# Patient Record
Sex: MALE | Race: WHITE
[De-identification: names, ages, dates, MRNs, and addresses within clinical notes are randomized per-mention and may not be internally consistent; named-entity substitution may affect disease eponyms.]

---

## 2017-04-24 ENCOUNTER — HOSPITAL ENCOUNTER (OUTPATIENT)
Dept: HOSPITAL 53 - M SFHCPLAZ | Age: 82
End: 2017-04-24
Attending: INTERNAL MEDICINE
Payer: COMMERCIAL

## 2017-04-24 DIAGNOSIS — E78.5: ICD-10-CM

## 2017-04-24 DIAGNOSIS — Z79.4: ICD-10-CM

## 2017-04-24 DIAGNOSIS — E11.9: Primary | ICD-10-CM

## 2017-04-24 LAB
ALBUMIN SERPL BCG-MCNC: 3.4 GM/DL (ref 3.2–5.2)
ALBUMIN/GLOB SERPL: 1.17 {RATIO} (ref 1–1.93)
ALP SERPL-CCNC: 89 U/L (ref 45–117)
ALT SERPL W P-5'-P-CCNC: 16 U/L (ref 12–78)
ANION GAP SERPL CALC-SCNC: 5 MEQ/L (ref 8–16)
AST SERPL-CCNC: 10 U/L (ref 15–37)
BILIRUB SERPL-MCNC: 0.6 MG/DL (ref 0.2–1)
BUN SERPL-MCNC: 14 MG/DL (ref 7–18)
CALCIUM SERPL-MCNC: 8.5 MG/DL (ref 8.8–10.2)
CHLORIDE SERPL-SCNC: 106 MEQ/L (ref 98–107)
CHOLEST SERPL-MCNC: 119 MG/DL (ref ?–200)
CO2 SERPL-SCNC: 32 MEQ/L (ref 21–32)
CREAT SERPL-MCNC: 0.87 MG/DL (ref 0.7–1.3)
ERYTHROCYTE [DISTWIDTH] IN BLOOD BY AUTOMATED COUNT: 14.3 % (ref 11.5–14.5)
EST. AVERAGE GLUCOSE BLD GHB EST-MCNC: 146 MG/DL (ref 60–110)
GFR SERPL CREATININE-BSD FRML MDRD: > 60 ML/MIN/{1.73_M2} (ref 35–?)
GLUCOSE SERPL-MCNC: 172 MG/DL (ref 83–110)
MCH RBC QN AUTO: 30.3 PG (ref 27–33)
MCHC RBC AUTO-ENTMCNC: 32.6 G/DL (ref 32–36.5)
MCV RBC AUTO: 93 FL (ref 80–96)
PLATELET # BLD AUTO: 219 K/MM3 (ref 150–450)
POTASSIUM SERPL-SCNC: 4.1 MEQ/L (ref 3.5–5.1)
PROT SERPL-MCNC: 6.3 GM/DL (ref 6.4–8.2)
SODIUM SERPL-SCNC: 143 MEQ/L (ref 136–145)
TRIGL SERPL-MCNC: 141 MG/DL (ref ?–150)
WBC # BLD AUTO: 5.2 K/MM3 (ref 4–10)

## 2017-10-19 ENCOUNTER — HOSPITAL ENCOUNTER (OUTPATIENT)
Dept: HOSPITAL 53 - M SFHCPLAZ | Age: 82
End: 2017-10-19
Attending: INTERNAL MEDICINE
Payer: COMMERCIAL

## 2017-10-19 DIAGNOSIS — E11.9: Primary | ICD-10-CM

## 2017-10-19 LAB
ALBUMIN SERPL BCG-MCNC: 3.1 GM/DL (ref 3.2–5.2)
ALBUMIN/GLOB SERPL: 1.03 {RATIO} (ref 1–1.93)
ALP SERPL-CCNC: 114 U/L (ref 45–117)
ALT SERPL W P-5'-P-CCNC: 23 U/L (ref 12–78)
ANION GAP SERPL CALC-SCNC: 6 MEQ/L (ref 8–16)
AST SERPL-CCNC: 11 U/L (ref 15–37)
BILIRUB SERPL-MCNC: 0.5 MG/DL (ref 0.2–1)
BUN SERPL-MCNC: 13 MG/DL (ref 7–18)
CALCIUM SERPL-MCNC: 8.5 MG/DL (ref 8.8–10.2)
CHLORIDE SERPL-SCNC: 106 MEQ/L (ref 98–107)
CO2 SERPL-SCNC: 31 MEQ/L (ref 21–32)
CREAT SERPL-MCNC: 0.88 MG/DL (ref 0.7–1.3)
EST. AVERAGE GLUCOSE BLD GHB EST-MCNC: 148 MG/DL (ref 60–110)
GFR SERPL CREATININE-BSD FRML MDRD: > 60 ML/MIN/{1.73_M2} (ref 35–?)
GLUCOSE SERPL-MCNC: 75 MG/DL (ref 83–110)
POTASSIUM SERPL-SCNC: 3.7 MEQ/L (ref 3.5–5.1)
PROT SERPL-MCNC: 6.1 GM/DL (ref 6.4–8.2)
SODIUM SERPL-SCNC: 143 MEQ/L (ref 136–145)

## 2017-10-27 ENCOUNTER — HOSPITAL ENCOUNTER (OUTPATIENT)
Dept: HOSPITAL 53 - M SFHCPLAZ | Age: 82
End: 2017-10-27
Attending: INTERNAL MEDICINE
Payer: COMMERCIAL

## 2017-10-27 DIAGNOSIS — Z23: ICD-10-CM

## 2017-10-27 DIAGNOSIS — R31.0: Primary | ICD-10-CM

## 2017-10-27 PROCEDURE — 81001 URINALYSIS AUTO W/SCOPE: CPT

## 2017-10-27 PROCEDURE — 87086 URINE CULTURE/COLONY COUNT: CPT

## 2017-10-27 PROCEDURE — 90662 IIV NO PRSV INCREASED AG IM: CPT

## 2017-11-15 ENCOUNTER — HOSPITAL ENCOUNTER (OUTPATIENT)
Dept: HOSPITAL 53 - M SMT | Age: 82
End: 2017-11-15
Attending: NURSE PRACTITIONER
Payer: COMMERCIAL

## 2017-11-15 DIAGNOSIS — R31.0: Primary | ICD-10-CM

## 2017-11-15 LAB
ANION GAP SERPL CALC-SCNC: 6 MEQ/L (ref 8–16)
BACTERIA URNS QL MICRO: (no result)
BUN SERPL-MCNC: 18 MG/DL (ref 7–18)
CALCIUM SERPL-MCNC: 8.5 MG/DL (ref 8.8–10.2)
CHLORIDE SERPL-SCNC: 109 MEQ/L (ref 98–107)
CO2 SERPL-SCNC: 29 MEQ/L (ref 21–32)
CREAT SERPL-MCNC: 1.04 MG/DL (ref 0.7–1.3)
GFR SERPL CREATININE-BSD FRML MDRD: > 60 ML/MIN/{1.73_M2} (ref 35–?)
GLUCOSE SERPL-MCNC: 144 MG/DL (ref 83–110)
HYALINE CASTS URNS QL MICRO: (no result) /LPF (ref 0–1)
MICROSCOPIC EXAM: (no result)
MICROSCOPIC INDICATED? MAN: YES
POTASSIUM SERPL-SCNC: 4.2 MEQ/L (ref 3.5–5.1)
RBC #/AREA URNS HPF: (no result) /HPF (ref 0–3)
SODIUM SERPL-SCNC: 144 MEQ/L (ref 136–145)
SQUAMOUS URNS QL MICRO: (no result) /HPF
TRANS CELLS #/AREA URNS HPF: (no result) /HPF
WBC #/AREA URNS HPF: (no result) /HPF (ref 0–3)

## 2017-11-15 PROCEDURE — 36415 COLL VENOUS BLD VENIPUNCTURE: CPT

## 2017-11-15 PROCEDURE — 51798 US URINE CAPACITY MEASURE: CPT

## 2017-11-15 PROCEDURE — 87086 URINE CULTURE/COLONY COUNT: CPT

## 2017-11-15 PROCEDURE — 81000 URINALYSIS NONAUTO W/SCOPE: CPT

## 2017-11-15 PROCEDURE — 80048 BASIC METABOLIC PNL TOTAL CA: CPT

## 2017-11-15 PROCEDURE — 88108 CYTOPATH CONCENTRATE TECH: CPT

## 2017-12-01 ENCOUNTER — HOSPITAL ENCOUNTER (OUTPATIENT)
Dept: HOSPITAL 53 - M SMT | Age: 82
End: 2017-12-01
Attending: UROLOGY
Payer: COMMERCIAL

## 2017-12-01 DIAGNOSIS — R31.0: Primary | ICD-10-CM

## 2017-12-13 ENCOUNTER — HOSPITAL ENCOUNTER (OUTPATIENT)
Dept: HOSPITAL 53 - M RAD | Age: 82
End: 2017-12-13
Attending: NURSE PRACTITIONER
Payer: COMMERCIAL

## 2017-12-13 DIAGNOSIS — N20.1: ICD-10-CM

## 2017-12-13 DIAGNOSIS — N20.0: Primary | ICD-10-CM

## 2017-12-13 DIAGNOSIS — K57.30: ICD-10-CM

## 2017-12-13 DIAGNOSIS — R93.41: ICD-10-CM

## 2017-12-13 DIAGNOSIS — N21.0: ICD-10-CM

## 2017-12-13 PROCEDURE — 74178 CT ABD&PLV WO CNTR FLWD CNTR: CPT

## 2017-12-13 NOTE — REP
CT urogram without and with IV contrast:

 

History:  Hematuria.  No comparison imaging.

 

CT contrast dose:  100 mL of intravenous Isovue 370.

 

CT findings:  Preliminary digital  radiograph shows a normal bowel gas

pattern.  There is a peripherally calcified large gallstone in the right upper

quadrant.  Sutures are seen in the right lower quadrant abdominal wall.

 

Axial CT images demonstrate minimal bibasilar interstitial pulmonary fibrosis.

No pleural effusion is seen.  No adrenal lesion is observed.  The liver and the

spleen are normal in size homogeneous in texture.  A large calcified gallstone is

confirmed within the gallbladder.  The stone measures 2.1 cm in greatest

diameter.  No pancreatic abnormality is observed.  No retroperitoneal mass or

adenopathy is seen.  There is no evidence of hydronephrosis.  There is a tiny 1

mm intrarenal calculus at the lower pole of the left kidney seen on noncontrast

study.  No other intrarenal nephrolithiasis is appreciated.  No mass or cyst is

seen.  Normal caliber aorta is observed.  There is a 3 mm calculus in the lumen

of the urinary bladder to the left of midline on noncontrast study.  Urinary

bladder is otherwise unremarkable.  No ureteral calculus is seen.  There are

dystrophic calcifications in the prostate.  There is sigmoid colon diverticulosis

without CT evidence of diverticulitis.  The patient is status post prior

appendectomy.  No abdominal wall defect is seen.  Bone window settings show

degenerative changes in the thoracic and lumbar spine segments.  No bony

destructive lesion is seen. Delayed scanning shows no filling defect in the

collecting system or ureters on either side.  There is however evidence of a left

posterior bladder mass measuring approximately 3-4 cm in size.

 

Impression:

 

1.  Tiny 1 mm intrarenal calculus lower pole left kidney.

 

2.  3 mm bladder calculus.

 

3.  No hydronephrosis.  No urinary tract mass lesion seen.

 

4.  3.8 cm left posterior bladder mass.

 

5.  Cholelithiasis.

 

6.  Left colonic diverticulosis.

 

 

Signed by

Perfecto Hwang MD 12/14/2017 10:56 A

## 2018-01-12 ENCOUNTER — HOSPITAL ENCOUNTER (OUTPATIENT)
Dept: HOSPITAL 53 - M SFHCPLAZ | Age: 83
End: 2018-01-12
Attending: INTERNAL MEDICINE
Payer: COMMERCIAL

## 2018-01-12 DIAGNOSIS — D49.4: ICD-10-CM

## 2018-01-12 DIAGNOSIS — Z79.899: ICD-10-CM

## 2018-01-12 DIAGNOSIS — Z79.4: ICD-10-CM

## 2018-01-12 DIAGNOSIS — Z01.818: Primary | ICD-10-CM

## 2018-01-12 DIAGNOSIS — E11.9: ICD-10-CM

## 2018-01-12 DIAGNOSIS — R31.0: ICD-10-CM

## 2018-01-12 PROCEDURE — 87086 URINE CULTURE/COLONY COUNT: CPT

## 2018-01-16 ENCOUNTER — HOSPITAL ENCOUNTER (OUTPATIENT)
Dept: HOSPITAL 53 - M SMT | Age: 83
End: 2018-01-16
Attending: UROLOGY
Payer: COMMERCIAL

## 2018-01-16 DIAGNOSIS — D49.4: ICD-10-CM

## 2018-01-16 DIAGNOSIS — R31.0: ICD-10-CM

## 2018-01-16 DIAGNOSIS — Z01.818: Primary | ICD-10-CM

## 2018-01-16 LAB
ANION GAP: 6 MEQ/L (ref 8–16)
APPEARANCE, URINE: (no result)
BACTERIA UR QL AUTO: NEGATIVE
BILIRUBIN, URINE AUTO: NEGATIVE
BLOOD UREA NITROGEN: 17 MG/DL (ref 7–18)
BLOOD, URINE BLOOD: (no result)
CALCIUM LEVEL: 8.5 MG/DL (ref 8.8–10.2)
CARBON DIOXIDE LEVEL: 30 MEQ/L (ref 21–32)
CHLORIDE LEVEL: 107 MEQ/L (ref 98–107)
CREATININE FOR GFR: 1.17 MG/DL (ref 0.7–1.3)
GFR SERPL CREATININE-BSD FRML MDRD: > 60 ML/MIN/{1.73_M2} (ref 35–?)
GLUCOSE, FASTING: 174 MG/DL (ref 83–110)
GLUCOSE, URINE (UA) AUTO: NEGATIVE MG/DL
HEMATOCRIT: 38.9 % (ref 42–52)
HEMOGLOBIN: 12.3 G/DL (ref 14–18)
INR: 1.07
KETONE, URINE AUTO: NEGATIVE MG/DL
LEUKOCYTE ESTERASE UR QL STRIP.AUTO: (no result)
MEAN CORPUSCULAR HEMOGLOBIN: 27.6 PG (ref 27–33)
MEAN CORPUSCULAR HGB CONC: 31.6 G/DL (ref 32–36.5)
MEAN CORPUSCULAR VOLUME: 87.4 FL (ref 80–96)
MUCUS, URINE: (no result)
NITRITE, URINE AUTO: NEGATIVE
NRBC BLD AUTO-RTO: 0 % (ref 0–0)
PARTIAL THROMBOPLASTIN TIME: 31 SECONDS (ref 26.8–37.9)
PH,URINE: 5 UNITS (ref 5–9)
PLATELET COUNT, AUTOMATED: 333 10^3/UL (ref 150–450)
POTASSIUM SERUM: 4 MEQ/L (ref 3.5–5.1)
PROT UR QL STRIP.AUTO: (no result) MG/DL
PROTHROMBIN TIME: 14 SECONDS (ref 12.4–14.5)
RBC, URINE AUTO: (no result) /HPF (ref 0–3)
RED BLOOD COUNT: 4.45 10^6/UL (ref 4.3–6.1)
RED CELL DISTRIBUTION WIDTH: 15.4 % (ref 11.5–14.5)
SODIUM LEVEL: 143 MEQ/L (ref 136–145)
SPECIFIC GRAVITY URINE AUTO: 1.02 (ref 1–1.03)
SQUAMOUS #/AREA URNS AUTO: 2 /HPF (ref 0–6)
UROBILINOGEN, URINE AUTO: 0.2 MG/DL (ref 0–2)
WBC, URINE AUTO: (no result) /HPF (ref 0–3)
WHITE BLOOD COUNT: 5.7 10^3/UL (ref 4–10)
YEAST LIKE CELL URINE AUTO: (no result)

## 2018-01-16 PROCEDURE — 80048 BASIC METABOLIC PNL TOTAL CA: CPT

## 2018-01-18 ENCOUNTER — HOSPITAL ENCOUNTER (OUTPATIENT)
Dept: HOSPITAL 53 - M SDC | Age: 83
Discharge: HOME | End: 2018-01-18
Attending: UROLOGY
Payer: COMMERCIAL

## 2018-01-18 DIAGNOSIS — E78.00: ICD-10-CM

## 2018-01-18 DIAGNOSIS — C67.2: Primary | ICD-10-CM

## 2018-01-18 DIAGNOSIS — Z79.899: ICD-10-CM

## 2018-01-18 DIAGNOSIS — M54.9: ICD-10-CM

## 2018-01-18 DIAGNOSIS — C67.4: ICD-10-CM

## 2018-01-18 DIAGNOSIS — E10.9: ICD-10-CM

## 2018-01-18 DIAGNOSIS — Z79.82: ICD-10-CM

## 2018-01-18 LAB
GLUCOSE BLDC GLUCOMTR-MCNC: 132 MG/DL (ref 83–110)
GLUCOSE BLDC GLUCOMTR-MCNC: 145 MG/DL (ref 83–110)

## 2018-01-18 PROCEDURE — 52235 CYSTOSCOPY AND TREATMENT: CPT

## 2018-01-18 RX ADMIN — SODIUM CHLORIDE, POTASSIUM CHLORIDE, SODIUM LACTATE AND CALCIUM CHLORIDE 1 MLS/HR: 600; 310; 30; 20 INJECTION, SOLUTION INTRAVENOUS at 09:50

## 2018-01-18 RX ADMIN — FENTANYL CITRATE 1 MCG: 50 INJECTION, SOLUTION INTRAMUSCULAR; INTRAVENOUS at 13:30

## 2018-01-18 RX ADMIN — FENTANYL CITRATE 1 MCG: 50 INJECTION, SOLUTION INTRAMUSCULAR; INTRAVENOUS at 13:53

## 2018-01-18 RX ADMIN — FENTANYL CITRATE 1 MCG: 50 INJECTION, SOLUTION INTRAMUSCULAR; INTRAVENOUS at 13:25

## 2018-01-18 RX ADMIN — FENTANYL CITRATE 1 MCG: 50 INJECTION, SOLUTION INTRAMUSCULAR; INTRAVENOUS at 13:41

## 2018-01-18 RX ADMIN — IOTHALAMATE MEGLUMINE 1 ML: 600 INJECTION INTRAVASCULAR at 12:12

## 2018-02-05 ENCOUNTER — HOSPITAL ENCOUNTER (OUTPATIENT)
Dept: HOSPITAL 53 - M SMT | Age: 83
End: 2018-02-05
Attending: UROLOGY
Payer: COMMERCIAL

## 2018-02-05 DIAGNOSIS — C67.2: Primary | ICD-10-CM

## 2018-02-05 LAB
APPEARANCE, URINE: (no result)
BACTERIA UR QL AUTO: (no result)
BILIRUBIN, URINE AUTO: NEGATIVE
BLOOD, URINE BLOOD: (no result)
CAOX CRY URNS QL MICRO: (no result)
GLUCOSE, URINE (UA) AUTO: NEGATIVE MG/DL
KETONE, URINE AUTO: NEGATIVE MG/DL
LEUKOCYTE ESTERASE UR QL STRIP.AUTO: (no result)
MUCUS, URINE: (no result)
NITRITE, URINE AUTO: POSITIVE
PH,URINE: 5 UNITS (ref 5–9)
PROT UR QL STRIP.AUTO: (no result) MG/DL
RBC, URINE AUTO: (no result) /HPF (ref 0–3)
SPECIFIC GRAVITY URINE AUTO: 1.02 (ref 1–1.03)
SQUAMOUS #/AREA URNS AUTO: 1 /HPF (ref 0–6)
UROBILINOGEN, URINE AUTO: 0.2 MG/DL (ref 0–2)
WBC, URINE AUTO: (no result) /HPF (ref 0–3)
YEAST LIKE CELL URINE AUTO: (no result)

## 2018-02-05 PROCEDURE — 81001 URINALYSIS AUTO W/SCOPE: CPT

## 2018-02-12 ENCOUNTER — HOSPITAL ENCOUNTER (OUTPATIENT)
Dept: HOSPITAL 53 - M RAD | Age: 83
End: 2018-02-12
Attending: UROLOGY
Payer: COMMERCIAL

## 2018-02-12 DIAGNOSIS — C67.2: Primary | ICD-10-CM

## 2018-02-12 PROCEDURE — 78306 BONE IMAGING WHOLE BODY: CPT

## 2018-02-23 ENCOUNTER — HOSPITAL ENCOUNTER (OUTPATIENT)
Dept: HOSPITAL 53 - M SMT | Age: 83
End: 2018-02-23
Attending: UROLOGY
Payer: COMMERCIAL

## 2018-02-23 DIAGNOSIS — Z01.812: Primary | ICD-10-CM

## 2018-02-23 DIAGNOSIS — C67.2: ICD-10-CM

## 2018-02-23 LAB
ANION GAP: 8 MEQ/L (ref 8–16)
BLOOD UREA NITROGEN: 14 MG/DL (ref 7–18)
CALCIUM LEVEL: 8.8 MG/DL (ref 8.8–10.2)
CARBON DIOXIDE LEVEL: 29 MEQ/L (ref 21–32)
CHLORIDE LEVEL: 107 MEQ/L (ref 98–107)
CREATININE FOR GFR: 1.02 MG/DL (ref 0.7–1.3)
GFR SERPL CREATININE-BSD FRML MDRD: > 60 ML/MIN/{1.73_M2} (ref 35–?)
GLUCOSE, FASTING: 143 MG/DL (ref 70–100)
HEMATOCRIT: 38.3 % (ref 42–52)
HEMOGLOBIN: 11.8 G/DL (ref 14–18)
INR: 1.03
MEAN CORPUSCULAR HEMOGLOBIN: 26.3 PG (ref 27–33)
MEAN CORPUSCULAR HGB CONC: 30.8 G/DL (ref 32–36.5)
MEAN CORPUSCULAR VOLUME: 85.3 FL (ref 80–96)
NRBC BLD AUTO-RTO: 0 % (ref 0–0)
PARTIAL THROMBOPLASTIN TIME: 32.6 SECONDS (ref 26.8–37.9)
PLATELET COUNT, AUTOMATED: 231 10^3/UL (ref 150–450)
POTASSIUM SERUM: 4.1 MEQ/L (ref 3.5–5.1)
PROTHROMBIN TIME: 13.6 SECONDS (ref 12.4–14.5)
RED BLOOD COUNT: 4.49 10^6/UL (ref 4.3–6.1)
RED CELL DISTRIBUTION WIDTH: 16 % (ref 11.5–14.5)
SODIUM LEVEL: 144 MEQ/L (ref 136–145)
WHITE BLOOD COUNT: 6.3 10^3/UL (ref 4–10)

## 2018-02-23 PROCEDURE — 80048 BASIC METABOLIC PNL TOTAL CA: CPT

## 2018-02-27 ENCOUNTER — HOSPITAL ENCOUNTER (INPATIENT)
Dept: HOSPITAL 53 - M OR | Age: 83
LOS: 7 days | Discharge: HOME | DRG: 654 | End: 2018-03-06
Attending: UROLOGY | Admitting: INTERNAL MEDICINE
Payer: COMMERCIAL

## 2018-02-27 DIAGNOSIS — M54.5: ICD-10-CM

## 2018-02-27 DIAGNOSIS — J98.11: ICD-10-CM

## 2018-02-27 DIAGNOSIS — C67.9: Primary | ICD-10-CM

## 2018-02-27 DIAGNOSIS — K56.7: ICD-10-CM

## 2018-02-27 DIAGNOSIS — N17.9: ICD-10-CM

## 2018-02-27 DIAGNOSIS — E11.9: ICD-10-CM

## 2018-02-27 DIAGNOSIS — Z79.4: ICD-10-CM

## 2018-02-27 DIAGNOSIS — Z79.899: ICD-10-CM

## 2018-02-27 DIAGNOSIS — J90: ICD-10-CM

## 2018-02-27 DIAGNOSIS — E78.5: ICD-10-CM

## 2018-02-27 LAB
ABG BASE EXCESS: -6.6 (ref -2–2)
ABG HCO3: 20.5 MEQ/L (ref 22–26)
ABG O2 SATURATION: 95.9 % (ref 95–99)
ABG PARTIAL PRESSURE CO2: 47.4 MMHG (ref 35–45)
ABG PARTIAL PRESSURE O2: 90.1 MMHG (ref 75–100)
ABG PH (ARTERIAL): 7.25 UNITS (ref 7.35–7.45)
ABG STANDARD HCO3: 19.1 MEQ/L (ref 22–26)
ABG TOTAL CO2: 22 MEQ/L (ref 23–31)
ANION GAP: 10 MEQ/L (ref 8–16)
BLOOD UREA NITROGEN: 13 MG/DL (ref 7–18)
CALCIUM LEVEL: 8.1 MG/DL (ref 8.8–10.2)
CARBON DIOXIDE LEVEL: 27 MEQ/L (ref 21–32)
CHLORIDE LEVEL: 107 MEQ/L (ref 98–107)
CREATININE FOR GFR: 1.68 MG/DL (ref 0.7–1.3)
GFR SERPL CREATININE-BSD FRML MDRD: 41.9 ML/MIN/{1.73_M2} (ref 35–?)
GLUCOSE BLDC GLUCOMTR-MCNC: 187 MG/DL (ref 83–110)
GLUCOSE BLDC GLUCOMTR-MCNC: 195 MG/DL (ref 83–110)
GLUCOSE BLDC GLUCOMTR-MCNC: 97 MG/DL (ref 83–110)
GLUCOSE, FASTING: 184 MG/DL (ref 70–100)
HEMATOCRIT: 34.8 % (ref 42–52)
HEMOGLOBIN: 11 G/DL (ref 14–18)
MAGNESIUM LEVEL: 1.7 MG/DL (ref 1.8–2.4)
MEAN CORPUSCULAR HEMOGLOBIN: 27.2 PG (ref 27–33)
MEAN CORPUSCULAR HGB CONC: 31.6 G/DL (ref 32–36.5)
MEAN CORPUSCULAR VOLUME: 86.1 FL (ref 80–96)
NRBC BLD AUTO-RTO: 0 % (ref 0–0)
PHOSPHORUS LEVEL: 4.2 MG/DL (ref 2.5–4.9)
PLATELET COUNT, AUTOMATED: 265 10^3/UL (ref 150–450)
POTASSIUM SERUM: 3.8 MEQ/L (ref 3.5–5.1)
RED BLOOD COUNT: 4.04 10^6/UL (ref 4.3–6.1)
RED CELL DISTRIBUTION WIDTH: 15.9 % (ref 11.5–14.5)
SODIUM LEVEL: 144 MEQ/L (ref 136–145)
WHITE BLOOD COUNT: 11.4 10^3/UL (ref 4–10)

## 2018-02-27 PROCEDURE — 8E0W4CZ ROBOTIC ASSISTED PROCEDURE OF TRUNK REGION, PERCUTANEOUS ENDOSCOPIC APPROACH: ICD-10-PCS

## 2018-02-27 PROCEDURE — 0T184JC BYPASS BILATERAL URETERS TO ILEOCUTANEOUS WITH SYNTHETIC SUBSTITUTE, PERCUTANEOUS ENDOSCOPIC APPROACH: ICD-10-PCS

## 2018-02-27 PROCEDURE — 0TTB4ZZ RESECTION OF BLADDER, PERCUTANEOUS ENDOSCOPIC APPROACH: ICD-10-PCS

## 2018-02-27 PROCEDURE — 07BC4ZX EXCISION OF PELVIS LYMPHATIC, PERCUTANEOUS ENDOSCOPIC APPROACH, DIAGNOSTIC: ICD-10-PCS

## 2018-02-27 RX ADMIN — METOCLOPRAMIDE 1 MG: 5 INJECTION, SOLUTION INTRAMUSCULAR; INTRAVENOUS at 20:49

## 2018-02-27 RX ADMIN — SODIUM CHLORIDE, POTASSIUM CHLORIDE, SODIUM LACTATE AND CALCIUM CHLORIDE 1 MLS/HR: 600; 310; 30; 20 INJECTION, SOLUTION INTRAVENOUS at 06:30

## 2018-02-27 RX ADMIN — SODIUM CHLORIDE 1 MLS/HR: 9 INJECTION, SOLUTION INTRAVENOUS at 18:49

## 2018-02-27 RX ADMIN — FENTANYL CITRATE 1 MCG: 50 INJECTION, SOLUTION INTRAMUSCULAR; INTRAVENOUS at 16:25

## 2018-02-27 RX ADMIN — MAGNESIUM SULFATE IN DEXTROSE 1 MLS/HR: 10 INJECTION, SOLUTION INTRAVENOUS at 17:00

## 2018-02-27 RX ADMIN — FENTANYL CITRATE 1 MCG: 50 INJECTION, SOLUTION INTRAMUSCULAR; INTRAVENOUS at 16:11

## 2018-02-27 RX ADMIN — DEXTROSE MONOHYDRATE 1 MG: 50 INJECTION, SOLUTION INTRAVENOUS at 20:50

## 2018-02-27 RX ADMIN — MORPHINE SULFATE 1 MG: 4 INJECTION INTRAVENOUS at 16:23

## 2018-02-27 RX ADMIN — HYDROMORPHONE HYDROCHLORIDE 1 MG: 1 INJECTION, SOLUTION INTRAMUSCULAR; INTRAVENOUS; SUBCUTANEOUS at 16:45

## 2018-02-27 RX ADMIN — MORPHINE SULFATE 1 MG: 4 INJECTION INTRAVENOUS at 16:40

## 2018-02-27 RX ADMIN — FENTANYL CITRATE 1 MCG: 50 INJECTION, SOLUTION INTRAMUSCULAR; INTRAVENOUS at 16:30

## 2018-02-27 RX ADMIN — MORPHINE SULFATE 1 MG: 4 INJECTION INTRAVENOUS at 16:30

## 2018-02-27 RX ADMIN — DEXTROSE MONOHYDRATE 1 MLS/HR: 5 INJECTION INTRAVENOUS at 07:35

## 2018-02-27 RX ADMIN — LABETALOL HYDROCHLORIDE 1 MG: 5 INJECTION INTRAVENOUS at 17:20

## 2018-02-27 RX ADMIN — HYDROMORPHONE HYDROCHLORIDE 1 MG: 1 INJECTION, SOLUTION INTRAMUSCULAR; INTRAVENOUS; SUBCUTANEOUS at 16:55

## 2018-02-27 RX ADMIN — HYDROMORPHONE HYDROCHLORIDE 1 MG: 1 INJECTION, SOLUTION INTRAMUSCULAR; INTRAVENOUS; SUBCUTANEOUS at 17:14

## 2018-02-27 RX ADMIN — ATORVASTATIN CALCIUM 1 MG: 20 TABLET, FILM COATED ORAL at 09:00

## 2018-02-27 RX ADMIN — HYDROMORPHONE HYDROCHLORIDE 1 MG: 1 INJECTION, SOLUTION INTRAMUSCULAR; INTRAVENOUS; SUBCUTANEOUS at 17:00

## 2018-02-27 RX ADMIN — ACETAMINOPHEN 1 MG: 650 TABLET, FILM COATED, EXTENDED RELEASE ORAL at 23:06

## 2018-02-27 RX ADMIN — MAGNESIUM SULFATE IN DEXTROSE 1 MLS/HR: 10 INJECTION, SOLUTION INTRAVENOUS at 17:59

## 2018-02-27 RX ADMIN — FENTANYL CITRATE 1 MCG: 50 INJECTION, SOLUTION INTRAMUSCULAR; INTRAVENOUS at 16:20

## 2018-02-27 RX ADMIN — SODIUM CHLORIDE, POTASSIUM CHLORIDE, SODIUM LACTATE AND CALCIUM CHLORIDE 1 MLS/HR: 600; 310; 30; 20 INJECTION, SOLUTION INTRAVENOUS at 16:45

## 2018-02-27 RX ADMIN — KETOROLAC TROMETHAMINE 1 MG: 30 INJECTION, SOLUTION INTRAMUSCULAR at 17:05

## 2018-02-27 RX ADMIN — INSULIN LISPRO 1 UNITS: 100 INJECTION, SOLUTION INTRAVENOUS; SUBCUTANEOUS at 21:00

## 2018-02-27 RX ADMIN — MORPHINE SULFATE 1 MG: 4 INJECTION INTRAVENOUS at 16:45

## 2018-02-27 RX ADMIN — DEXTROSE MONOHYDRATE 1 MLS/HR: 5 INJECTION INTRAVENOUS at 20:49

## 2018-02-27 RX ADMIN — HYDROMORPHONE HYDROCHLORIDE 1 MG: 1 INJECTION, SOLUTION INTRAMUSCULAR; INTRAVENOUS; SUBCUTANEOUS at 16:50

## 2018-02-27 RX ADMIN — MORPHINE SULFATE 1 MG: 4 INJECTION INTRAVENOUS at 16:50

## 2018-02-28 LAB
ABG BASE EXCESS: 0.1 (ref -2–2)
ABG HCO3: 25.3 MEQ/L (ref 22–26)
ABG O2 SATURATION: 93.6 % (ref 95–99)
ABG PARTIAL PRESSURE CO2: 43.8 MMHG (ref 35–45)
ABG PARTIAL PRESSURE O2: 69.8 MMHG (ref 75–100)
ABG PH (ARTERIAL): 7.38 UNITS (ref 7.35–7.45)
ABG STANDARD HCO3: 24.5 MEQ/L (ref 22–26)
ABG TOTAL CO2: 26.7 MEQ/L (ref 23–31)
ALBUMIN: 2.4 GM/DL (ref 3.2–5.2)
ANION GAP: 6 MEQ/L (ref 8–16)
BLOOD UREA NITROGEN: 18 MG/DL (ref 7–18)
CALCIUM LEVEL: 7.8 MG/DL (ref 8.8–10.2)
CARBON DIOXIDE LEVEL: 29 MEQ/L (ref 21–32)
CHLORIDE LEVEL: 108 MEQ/L (ref 98–107)
CREATININE FOR GFR: 2.06 MG/DL (ref 0.7–1.3)
GFR SERPL CREATININE-BSD FRML MDRD: 33.1 ML/MIN/{1.73_M2} (ref 35–?)
GLUCOSE BLDC GLUCOMTR-MCNC: 137 MG/DL (ref 83–110)
GLUCOSE BLDC GLUCOMTR-MCNC: 161 MG/DL (ref 83–110)
GLUCOSE BLDC GLUCOMTR-MCNC: 183 MG/DL (ref 83–110)
GLUCOSE BLDC GLUCOMTR-MCNC: 217 MG/DL (ref 83–110)
GLUCOSE BLDC GLUCOMTR-MCNC: 249 MG/DL (ref 83–110)
GLUCOSE, FASTING: 203 MG/DL (ref 70–100)
HEMATOCRIT: 31.8 % (ref 42–52)
HEMOGLOBIN: 10 G/DL (ref 14–18)
MAGNESIUM LEVEL: 2.2 MG/DL (ref 1.8–2.4)
MEAN CORPUSCULAR HEMOGLOBIN: 26.6 PG (ref 27–33)
MEAN CORPUSCULAR HGB CONC: 31.4 G/DL (ref 32–36.5)
MEAN CORPUSCULAR VOLUME: 84.6 FL (ref 80–96)
NRBC BLD AUTO-RTO: 0 % (ref 0–0)
PHOSPHORUS LEVEL: 4 MG/DL (ref 2.5–4.9)
PLATELET COUNT, AUTOMATED: 252 10^3/UL (ref 150–450)
POTASSIUM SERUM: 3.8 MEQ/L (ref 3.5–5.1)
RED BLOOD COUNT: 3.76 10^6/UL (ref 4.3–6.1)
RED CELL DISTRIBUTION WIDTH: 16.1 % (ref 11.5–14.5)
SODIUM LEVEL: 143 MEQ/L (ref 136–145)
WHITE BLOOD COUNT: 9.8 10^3/UL (ref 4–10)

## 2018-02-28 RX ADMIN — ACETAMINOPHEN 1 MG: 650 TABLET, FILM COATED, EXTENDED RELEASE ORAL at 05:03

## 2018-02-28 RX ADMIN — METOCLOPRAMIDE 1 MG: 5 INJECTION, SOLUTION INTRAMUSCULAR; INTRAVENOUS at 17:56

## 2018-02-28 RX ADMIN — SODIUM CHLORIDE 1 MLS/HR: 9 INJECTION, SOLUTION INTRAVENOUS at 01:26

## 2018-02-28 RX ADMIN — METOCLOPRAMIDE 1 MG: 5 INJECTION, SOLUTION INTRAMUSCULAR; INTRAVENOUS at 05:03

## 2018-02-28 RX ADMIN — INSULIN DETEMIR 1 UNITS: 100 INJECTION, SOLUTION SUBCUTANEOUS at 09:00

## 2018-02-28 RX ADMIN — INSULIN LISPRO 4 UNITS: 100 INJECTION, SOLUTION INTRAVENOUS; SUBCUTANEOUS at 17:56

## 2018-02-28 RX ADMIN — INSULIN LISPRO 1 UNITS: 100 INJECTION, SOLUTION INTRAVENOUS; SUBCUTANEOUS at 20:36

## 2018-02-28 RX ADMIN — MORPHINE SULFATE 1 MG: 4 INJECTION INTRAVENOUS at 14:40

## 2018-02-28 RX ADMIN — DEXTROSE MONOHYDRATE 1 MLS/HR: 5 INJECTION INTRAVENOUS at 09:19

## 2018-02-28 RX ADMIN — DEXTROSE MONOHYDRATE 1 MG: 50 INJECTION, SOLUTION INTRAVENOUS at 17:55

## 2018-02-28 RX ADMIN — MORPHINE SULFATE 1 MG: 4 INJECTION INTRAVENOUS at 23:12

## 2018-02-28 RX ADMIN — METOCLOPRAMIDE 1 MG: 5 INJECTION, SOLUTION INTRAMUSCULAR; INTRAVENOUS at 01:25

## 2018-02-28 RX ADMIN — KETOROLAC TROMETHAMINE 1 MG: 30 INJECTION, SOLUTION INTRAMUSCULAR at 01:25

## 2018-02-28 RX ADMIN — MORPHINE SULFATE 1 MG: 4 INJECTION INTRAVENOUS at 11:57

## 2018-02-28 RX ADMIN — INSULIN LISPRO 1 UNITS: 100 INJECTION, SOLUTION INTRAVENOUS; SUBCUTANEOUS at 07:30

## 2018-02-28 RX ADMIN — TAMSULOSIN HYDROCHLORIDE 1 MG: 0.4 CAPSULE ORAL at 09:19

## 2018-02-28 RX ADMIN — METOCLOPRAMIDE 1 MG: 5 INJECTION, SOLUTION INTRAMUSCULAR; INTRAVENOUS at 11:57

## 2018-02-28 RX ADMIN — POTASSIUM CHLORIDE AND SODIUM CHLORIDE 1 MLS/HR: 900; 150 INJECTION, SOLUTION INTRAVENOUS at 17:55

## 2018-02-28 RX ADMIN — INSULIN LISPRO 6 UNITS: 100 INJECTION, SOLUTION INTRAVENOUS; SUBCUTANEOUS at 12:59

## 2018-02-28 RX ADMIN — ACETAMINOPHEN 1 MG: 650 TABLET, FILM COATED, EXTENDED RELEASE ORAL at 14:40

## 2018-02-28 RX ADMIN — ACETAMINOPHEN 1 MG: 650 TABLET, FILM COATED, EXTENDED RELEASE ORAL at 21:06

## 2018-02-28 RX ADMIN — SODIUM CHLORIDE 1 MLS/HR: 9 INJECTION, SOLUTION INTRAVENOUS at 09:19

## 2018-02-28 RX ADMIN — DEXTROSE MONOHYDRATE 1 MLS/HR: 5 INJECTION INTRAVENOUS at 20:29

## 2018-03-01 LAB
ALBUMIN: 2.1 GM/DL (ref 3.2–5.2)
ANION GAP: 8 MEQ/L (ref 8–16)
BLOOD UREA NITROGEN: 23 MG/DL (ref 7–18)
CALCIUM LEVEL: 7.7 MG/DL (ref 8.8–10.2)
CARBON DIOXIDE LEVEL: 25 MEQ/L (ref 21–32)
CHLORIDE LEVEL: 108 MEQ/L (ref 98–107)
CREATININE FOR GFR: 2.34 MG/DL (ref 0.7–1.3)
GFR SERPL CREATININE-BSD FRML MDRD: 28.6 ML/MIN/{1.73_M2} (ref 35–?)
GLUCOSE BLDC GLUCOMTR-MCNC: 213 MG/DL (ref 83–110)
GLUCOSE BLDC GLUCOMTR-MCNC: 224 MG/DL (ref 83–110)
GLUCOSE, FASTING: 180 MG/DL (ref 70–100)
HEMATOCRIT: 30.3 % (ref 42–52)
HEMOGLOBIN: 9.6 G/DL (ref 14–18)
MEAN CORPUSCULAR HEMOGLOBIN: 26.7 PG (ref 27–33)
MEAN CORPUSCULAR HGB CONC: 31.7 G/DL (ref 32–36.5)
MEAN CORPUSCULAR VOLUME: 84.2 FL (ref 80–96)
NRBC BLD AUTO-RTO: 0 % (ref 0–0)
PHOSPHORUS LEVEL: 2.9 MG/DL (ref 2.5–4.9)
PLATELET COUNT, AUTOMATED: 248 10^3/UL (ref 150–450)
POTASSIUM SERUM: 3.8 MEQ/L (ref 3.5–5.1)
RED BLOOD COUNT: 3.6 10^6/UL (ref 4.3–6.1)
RED CELL DISTRIBUTION WIDTH: 16.2 % (ref 11.5–14.5)
SODIUM LEVEL: 141 MEQ/L (ref 136–145)
WHITE BLOOD COUNT: 9.9 10^3/UL (ref 4–10)

## 2018-03-01 RX ADMIN — POTASSIUM CHLORIDE AND SODIUM CHLORIDE 1 MLS/HR: 900; 150 INJECTION, SOLUTION INTRAVENOUS at 06:43

## 2018-03-01 RX ADMIN — FUROSEMIDE 1 MG: 10 INJECTION, SOLUTION INTRAMUSCULAR; INTRAVENOUS at 08:34

## 2018-03-01 RX ADMIN — METOCLOPRAMIDE 1 MG: 5 INJECTION, SOLUTION INTRAMUSCULAR; INTRAVENOUS at 13:43

## 2018-03-01 RX ADMIN — MORPHINE SULFATE 1 MG: 4 INJECTION INTRAVENOUS at 21:04

## 2018-03-01 RX ADMIN — POTASSIUM CHLORIDE AND SODIUM CHLORIDE 1 MLS/HR: 900; 150 INJECTION, SOLUTION INTRAVENOUS at 05:08

## 2018-03-01 RX ADMIN — DEXTROSE MONOHYDRATE 1 MG: 50 INJECTION, SOLUTION INTRAVENOUS at 18:11

## 2018-03-01 RX ADMIN — INSULIN LISPRO 4 UNITS: 100 INJECTION, SOLUTION INTRAVENOUS; SUBCUTANEOUS at 08:34

## 2018-03-01 RX ADMIN — METOCLOPRAMIDE 1 MG: 5 INJECTION, SOLUTION INTRAMUSCULAR; INTRAVENOUS at 05:08

## 2018-03-01 RX ADMIN — ACETAMINOPHEN 1 MG: 650 TABLET, FILM COATED, EXTENDED RELEASE ORAL at 13:43

## 2018-03-01 RX ADMIN — METOCLOPRAMIDE 1 MG: 5 INJECTION, SOLUTION INTRAMUSCULAR; INTRAVENOUS at 18:11

## 2018-03-01 RX ADMIN — MORPHINE SULFATE 1 MG: 4 INJECTION INTRAVENOUS at 03:22

## 2018-03-01 RX ADMIN — DEXTROSE MONOHYDRATE 1 MLS/HR: 5 INJECTION INTRAVENOUS at 21:05

## 2018-03-01 RX ADMIN — METOCLOPRAMIDE 1 MG: 5 INJECTION, SOLUTION INTRAMUSCULAR; INTRAVENOUS at 00:00

## 2018-03-01 RX ADMIN — ACETAMINOPHEN 1 MG: 650 TABLET, FILM COATED, EXTENDED RELEASE ORAL at 05:08

## 2018-03-01 RX ADMIN — INSULIN LISPRO 6 UNITS: 100 INJECTION, SOLUTION INTRAVENOUS; SUBCUTANEOUS at 13:42

## 2018-03-01 RX ADMIN — INSULIN LISPRO 6 UNITS: 100 INJECTION, SOLUTION INTRAVENOUS; SUBCUTANEOUS at 18:12

## 2018-03-01 RX ADMIN — ATORVASTATIN CALCIUM 1 MG: 20 TABLET, FILM COATED ORAL at 08:34

## 2018-03-01 RX ADMIN — INSULIN LISPRO 1 UNITS: 100 INJECTION, SOLUTION INTRAVENOUS; SUBCUTANEOUS at 21:00

## 2018-03-01 RX ADMIN — DEXTROSE MONOHYDRATE 1 MLS/HR: 5 INJECTION INTRAVENOUS at 08:33

## 2018-03-01 RX ADMIN — ACETAMINOPHEN 1 MG: 650 TABLET, FILM COATED, EXTENDED RELEASE ORAL at 21:34

## 2018-03-01 RX ADMIN — INSULIN DETEMIR 1 UNITS: 100 INJECTION, SOLUTION SUBCUTANEOUS at 08:27

## 2018-03-02 LAB
ALBUMIN: 2 GM/DL (ref 3.2–5.2)
ANION GAP: 8 MEQ/L (ref 8–16)
BLOOD UREA NITROGEN: 22 MG/DL (ref 7–18)
CALCIUM LEVEL: 8.2 MG/DL (ref 8.8–10.2)
CARBON DIOXIDE LEVEL: 28 MEQ/L (ref 21–32)
CHLORIDE LEVEL: 103 MEQ/L (ref 98–107)
CREAT FLD-MCNC: 10 MG/DL
CREATININE FOR GFR: 2.26 MG/DL (ref 0.7–1.3)
GFR SERPL CREATININE-BSD FRML MDRD: 29.7 ML/MIN/{1.73_M2} (ref 35–?)
GLUCOSE BLDC GLUCOMTR-MCNC: 217 MG/DL (ref 83–110)
GLUCOSE BLDC GLUCOMTR-MCNC: 224 MG/DL (ref 83–110)
GLUCOSE BLDC GLUCOMTR-MCNC: 231 MG/DL (ref 83–110)
GLUCOSE BLDC GLUCOMTR-MCNC: 256 MG/DL (ref 83–110)
GLUCOSE, FASTING: 225 MG/DL (ref 70–100)
HEMATOCRIT: 31.8 % (ref 42–52)
HEMOGLOBIN: 10.3 G/DL (ref 14–18)
MEAN CORPUSCULAR HEMOGLOBIN: 27 PG (ref 27–33)
MEAN CORPUSCULAR HGB CONC: 32.4 G/DL (ref 32–36.5)
MEAN CORPUSCULAR VOLUME: 83.2 FL (ref 80–96)
NRBC BLD AUTO-RTO: 0 % (ref 0–0)
PHOSPHORUS LEVEL: 2.1 MG/DL (ref 2.5–4.9)
PLATELET COUNT, AUTOMATED: 270 10^3/UL (ref 150–450)
POTASSIUM SERUM: 3.7 MEQ/L (ref 3.5–5.1)
RED BLOOD COUNT: 3.82 10^6/UL (ref 4.3–6.1)
RED CELL DISTRIBUTION WIDTH: 16.1 % (ref 11.5–14.5)
SODIUM LEVEL: 139 MEQ/L (ref 136–145)
SPECIMEN SOURCE FLD: (no result)
WHITE BLOOD COUNT: 10.2 10^3/UL (ref 4–10)

## 2018-03-02 RX ADMIN — DEXTROSE MONOHYDRATE 1 MLS/HR: 5 INJECTION INTRAVENOUS at 09:52

## 2018-03-02 RX ADMIN — INSULIN LISPRO 4 UNITS: 100 INJECTION, SOLUTION INTRAVENOUS; SUBCUTANEOUS at 17:30

## 2018-03-02 RX ADMIN — MORPHINE SULFATE 1 MG: 4 INJECTION INTRAVENOUS at 03:50

## 2018-03-02 RX ADMIN — MORPHINE SULFATE 1 MG: 4 INJECTION INTRAVENOUS at 06:24

## 2018-03-02 RX ADMIN — INSULIN LISPRO 6 UNITS: 100 INJECTION, SOLUTION INTRAVENOUS; SUBCUTANEOUS at 07:30

## 2018-03-02 RX ADMIN — INSULIN LISPRO 1 UNITS: 100 INJECTION, SOLUTION INTRAVENOUS; SUBCUTANEOUS at 21:00

## 2018-03-02 RX ADMIN — FUROSEMIDE 1 MG: 10 INJECTION, SOLUTION INTRAMUSCULAR; INTRAVENOUS at 06:23

## 2018-03-02 RX ADMIN — INSULIN DETEMIR 1 UNITS: 100 INJECTION, SOLUTION SUBCUTANEOUS at 09:53

## 2018-03-02 RX ADMIN — DEXTROSE MONOHYDRATE 1 MG: 50 INJECTION, SOLUTION INTRAVENOUS at 18:04

## 2018-03-02 RX ADMIN — ACETAMINOPHEN 1 MG: 650 TABLET, FILM COATED, EXTENDED RELEASE ORAL at 13:37

## 2018-03-02 RX ADMIN — ACETAMINOPHEN 1 MG: 650 TABLET, FILM COATED, EXTENDED RELEASE ORAL at 05:27

## 2018-03-02 RX ADMIN — METOCLOPRAMIDE 1 MG: 5 INJECTION, SOLUTION INTRAMUSCULAR; INTRAVENOUS at 18:04

## 2018-03-02 RX ADMIN — METOCLOPRAMIDE 1 MG: 5 INJECTION, SOLUTION INTRAMUSCULAR; INTRAVENOUS at 05:27

## 2018-03-02 RX ADMIN — ACETAMINOPHEN 1 MG: 650 TABLET, FILM COATED, EXTENDED RELEASE ORAL at 22:31

## 2018-03-02 RX ADMIN — METOCLOPRAMIDE 1 MG: 5 INJECTION, SOLUTION INTRAMUSCULAR; INTRAVENOUS at 00:42

## 2018-03-02 RX ADMIN — METOCLOPRAMIDE 1 MG: 5 INJECTION, SOLUTION INTRAMUSCULAR; INTRAVENOUS at 13:38

## 2018-03-02 RX ADMIN — INSULIN LISPRO 8 UNITS: 100 INJECTION, SOLUTION INTRAVENOUS; SUBCUTANEOUS at 12:00

## 2018-03-03 LAB
ALBUMIN: 1.9 GM/DL (ref 3.2–5.2)
ANION GAP: 8 MEQ/L (ref 8–16)
ANION GAP: 9 MEQ/L (ref 8–16)
BLOOD UREA NITROGEN: 30 MG/DL (ref 7–18)
BLOOD UREA NITROGEN: 30 MG/DL (ref 7–18)
CALCIUM LEVEL: 8.5 MG/DL (ref 8.8–10.2)
CALCIUM LEVEL: 8.6 MG/DL (ref 8.8–10.2)
CARBON DIOXIDE LEVEL: 28 MEQ/L (ref 21–32)
CARBON DIOXIDE LEVEL: 29 MEQ/L (ref 21–32)
CHLORIDE LEVEL: 102 MEQ/L (ref 98–107)
CHLORIDE LEVEL: 103 MEQ/L (ref 98–107)
CREAT FLD-MCNC: 2.6 MG/DL
CREATININE FOR GFR: 2.18 MG/DL (ref 0.7–1.3)
CREATININE FOR GFR: 2.57 MG/DL (ref 0.7–1.3)
GFR SERPL CREATININE-BSD FRML MDRD: 25.6 ML/MIN/{1.73_M2} (ref 35–?)
GFR SERPL CREATININE-BSD FRML MDRD: 31 ML/MIN/{1.73_M2} (ref 35–?)
GLUCOSE BLDC GLUCOMTR-MCNC: 150 MG/DL (ref 83–110)
GLUCOSE BLDC GLUCOMTR-MCNC: 271 MG/DL (ref 83–110)
GLUCOSE, FASTING: 267 MG/DL (ref 70–100)
GLUCOSE, FASTING: 272 MG/DL (ref 70–100)
HEMATOCRIT: 34.2 % (ref 42–52)
HEMOGLOBIN: 11 G/DL (ref 14–18)
MAGNESIUM LEVEL: 1.8 MG/DL (ref 1.8–2.4)
MEAN CORPUSCULAR HEMOGLOBIN: 26.6 PG (ref 27–33)
MEAN CORPUSCULAR HGB CONC: 32.2 G/DL (ref 32–36.5)
MEAN CORPUSCULAR VOLUME: 82.6 FL (ref 80–96)
NRBC BLD AUTO-RTO: 0 % (ref 0–0)
PHOSPHORUS LEVEL: 2.3 MG/DL (ref 2.5–4.9)
PHOSPHORUS LEVEL: 2.5 MG/DL (ref 2.5–4.9)
PLATELET COUNT, AUTOMATED: 332 10^3/UL (ref 150–450)
POTASSIUM SERUM: 3.9 MEQ/L (ref 3.5–5.1)
POTASSIUM SERUM: 4.3 MEQ/L (ref 3.5–5.1)
RED BLOOD COUNT: 4.14 10^6/UL (ref 4.3–6.1)
RED CELL DISTRIBUTION WIDTH: 16 % (ref 11.5–14.5)
SODIUM LEVEL: 138 MEQ/L (ref 136–145)
SODIUM LEVEL: 141 MEQ/L (ref 136–145)
SPECIMEN SOURCE FLD: (no result)
WHITE BLOOD COUNT: 10.5 10^3/UL (ref 4–10)

## 2018-03-03 RX ADMIN — ACETAMINOPHEN 1 MG: 650 TABLET, FILM COATED, EXTENDED RELEASE ORAL at 15:07

## 2018-03-03 RX ADMIN — INSULIN LISPRO 1 UNITS: 100 INJECTION, SOLUTION INTRAVENOUS; SUBCUTANEOUS at 20:54

## 2018-03-03 RX ADMIN — DEXTROSE MONOHYDRATE 1 MG: 50 INJECTION, SOLUTION INTRAVENOUS at 17:51

## 2018-03-03 RX ADMIN — ATORVASTATIN CALCIUM 1 MG: 20 TABLET, FILM COATED ORAL at 09:52

## 2018-03-03 RX ADMIN — INSULIN LISPRO 8 UNITS: 100 INJECTION, SOLUTION INTRAVENOUS; SUBCUTANEOUS at 17:51

## 2018-03-03 RX ADMIN — ACETAMINOPHEN 1 MG: 650 TABLET, FILM COATED, EXTENDED RELEASE ORAL at 23:16

## 2018-03-03 RX ADMIN — INSULIN LISPRO 8 UNITS: 100 INJECTION, SOLUTION INTRAVENOUS; SUBCUTANEOUS at 12:48

## 2018-03-03 RX ADMIN — METOCLOPRAMIDE 1 MG: 5 INJECTION, SOLUTION INTRAMUSCULAR; INTRAVENOUS at 06:07

## 2018-03-03 RX ADMIN — METOCLOPRAMIDE 1 MG: 5 INJECTION, SOLUTION INTRAMUSCULAR; INTRAVENOUS at 12:48

## 2018-03-03 RX ADMIN — INSULIN DETEMIR 1 UNITS: 100 INJECTION, SOLUTION SUBCUTANEOUS at 09:53

## 2018-03-03 RX ADMIN — METOCLOPRAMIDE 1 MG: 5 INJECTION, SOLUTION INTRAMUSCULAR; INTRAVENOUS at 23:16

## 2018-03-03 RX ADMIN — METOCLOPRAMIDE 1 MG: 5 INJECTION, SOLUTION INTRAMUSCULAR; INTRAVENOUS at 00:23

## 2018-03-03 RX ADMIN — METOCLOPRAMIDE 1 MG: 5 INJECTION, SOLUTION INTRAMUSCULAR; INTRAVENOUS at 17:51

## 2018-03-03 RX ADMIN — INSULIN LISPRO 6 UNITS: 100 INJECTION, SOLUTION INTRAVENOUS; SUBCUTANEOUS at 09:52

## 2018-03-03 RX ADMIN — ACETAMINOPHEN 1 MG: 650 TABLET, FILM COATED, EXTENDED RELEASE ORAL at 06:07

## 2018-03-03 RX ADMIN — FUROSEMIDE 1 MG: 10 INJECTION, SOLUTION INTRAMUSCULAR; INTRAVENOUS at 09:51

## 2018-03-04 LAB
ALBUMIN: 2.1 GM/DL (ref 3.2–5.2)
ANION GAP: 8 MEQ/L (ref 8–16)
BLOOD UREA NITROGEN: 36 MG/DL (ref 7–18)
CALCIUM LEVEL: 8.7 MG/DL (ref 8.8–10.2)
CARBON DIOXIDE LEVEL: 31 MEQ/L (ref 21–32)
CHLORIDE LEVEL: 101 MEQ/L (ref 98–107)
CREATININE FOR GFR: 2.58 MG/DL (ref 0.7–1.3)
GFR SERPL CREATININE-BSD FRML MDRD: 25.5 ML/MIN/{1.73_M2} (ref 35–?)
GLUCOSE BLDC GLUCOMTR-MCNC: 136 MG/DL (ref 83–110)
GLUCOSE BLDC GLUCOMTR-MCNC: 210 MG/DL (ref 83–110)
GLUCOSE BLDC GLUCOMTR-MCNC: 244 MG/DL (ref 83–110)
GLUCOSE, FASTING: 230 MG/DL (ref 70–100)
HEMATOCRIT: 35.1 % (ref 42–52)
HEMOGLOBIN: 11.5 G/DL (ref 14–18)
MEAN CORPUSCULAR HEMOGLOBIN: 26.7 PG (ref 27–33)
MEAN CORPUSCULAR HGB CONC: 32.8 G/DL (ref 32–36.5)
MEAN CORPUSCULAR VOLUME: 81.6 FL (ref 80–96)
NRBC BLD AUTO-RTO: 0 % (ref 0–0)
PHOSPHORUS LEVEL: 2.3 MG/DL (ref 2.5–4.9)
PLATELET COUNT, AUTOMATED: 404 10^3/UL (ref 150–450)
POTASSIUM SERUM: 4.2 MEQ/L (ref 3.5–5.1)
RED BLOOD COUNT: 4.3 10^6/UL (ref 4.3–6.1)
RED CELL DISTRIBUTION WIDTH: 16 % (ref 11.5–14.5)
SODIUM LEVEL: 140 MEQ/L (ref 136–145)
WHITE BLOOD COUNT: 10.6 10^3/UL (ref 4–10)

## 2018-03-04 RX ADMIN — METOCLOPRAMIDE 1 MG: 5 INJECTION, SOLUTION INTRAMUSCULAR; INTRAVENOUS at 06:04

## 2018-03-04 RX ADMIN — ACETAMINOPHEN 1 MG: 650 TABLET, FILM COATED, EXTENDED RELEASE ORAL at 06:00

## 2018-03-04 RX ADMIN — INSULIN LISPRO 6 UNITS: 100 INJECTION, SOLUTION INTRAVENOUS; SUBCUTANEOUS at 14:03

## 2018-03-04 RX ADMIN — INSULIN LISPRO 6 UNITS: 100 INJECTION, SOLUTION INTRAVENOUS; SUBCUTANEOUS at 09:47

## 2018-03-04 RX ADMIN — INSULIN LISPRO 6 UNITS: 100 INJECTION, SOLUTION INTRAVENOUS; SUBCUTANEOUS at 17:34

## 2018-03-04 RX ADMIN — INSULIN DETEMIR 1 UNITS: 100 INJECTION, SOLUTION SUBCUTANEOUS at 09:00

## 2018-03-04 RX ADMIN — INSULIN LISPRO 1 UNITS: 100 INJECTION, SOLUTION INTRAVENOUS; SUBCUTANEOUS at 20:50

## 2018-03-04 RX ADMIN — METOCLOPRAMIDE 1 MG: 5 INJECTION, SOLUTION INTRAMUSCULAR; INTRAVENOUS at 12:37

## 2018-03-04 RX ADMIN — ONDANSETRON 1 MG: 2 INJECTION INTRAMUSCULAR; INTRAVENOUS at 20:51

## 2018-03-04 RX ADMIN — POLYETHYLENE GLYCOL 3350 1 PKT: 17 POWDER, FOR SOLUTION ORAL at 12:37

## 2018-03-04 RX ADMIN — DEXTROSE MONOHYDRATE 1 MG: 50 INJECTION, SOLUTION INTRAVENOUS at 17:34

## 2018-03-04 RX ADMIN — BISACODYL 1 MG: 10 SUPPOSITORY RECTAL at 10:31

## 2018-03-05 LAB
ALBUMIN: 1.9 GM/DL (ref 3.2–5.2)
ANION GAP: 7 MEQ/L (ref 8–16)
BLOOD UREA NITROGEN: 39 MG/DL (ref 7–18)
CALCIUM LEVEL: 8.6 MG/DL (ref 8.8–10.2)
CARBON DIOXIDE LEVEL: 32 MEQ/L (ref 21–32)
CHLORIDE LEVEL: 102 MEQ/L (ref 98–107)
CREATININE FOR GFR: 1.88 MG/DL (ref 0.7–1.3)
GFR SERPL CREATININE-BSD FRML MDRD: 36.8 ML/MIN/{1.73_M2} (ref 35–?)
GLUCOSE BLDC GLUCOMTR-MCNC: 100 MG/DL (ref 83–110)
GLUCOSE BLDC GLUCOMTR-MCNC: 153 MG/DL (ref 83–110)
GLUCOSE BLDC GLUCOMTR-MCNC: 72 MG/DL (ref 83–110)
GLUCOSE BLDC GLUCOMTR-MCNC: 90 MG/DL (ref 83–110)
GLUCOSE, FASTING: 140 MG/DL (ref 70–100)
HEMATOCRIT: 34.2 % (ref 42–52)
HEMOGLOBIN: 11.2 G/DL (ref 14–18)
MAGNESIUM LEVEL: 2 MG/DL (ref 1.8–2.4)
MEAN CORPUSCULAR HEMOGLOBIN: 26.4 PG (ref 27–33)
MEAN CORPUSCULAR HGB CONC: 32.7 G/DL (ref 32–36.5)
MEAN CORPUSCULAR VOLUME: 80.5 FL (ref 80–96)
NRBC BLD AUTO-RTO: 0 % (ref 0–0)
PHOSPHORUS LEVEL: 2.8 MG/DL (ref 2.5–4.9)
PLATELET COUNT, AUTOMATED: 448 10^3/UL (ref 150–450)
POTASSIUM SERUM: 3.4 MEQ/L (ref 3.5–5.1)
RED BLOOD COUNT: 4.25 10^6/UL (ref 4.3–6.1)
RED CELL DISTRIBUTION WIDTH: 15.9 % (ref 11.5–14.5)
SODIUM LEVEL: 141 MEQ/L (ref 136–145)
WHITE BLOOD COUNT: 10.5 10^3/UL (ref 4–10)

## 2018-03-05 RX ADMIN — DEXTROSE MONOHYDRATE 1 ML: 25 INJECTION, SOLUTION INTRAVENOUS at 21:27

## 2018-03-05 RX ADMIN — DOCUSATE SODIUM,SENNOSIDES 1 TAB: 50; 8.6 TABLET, FILM COATED ORAL at 09:41

## 2018-03-05 RX ADMIN — INSULIN LISPRO 1 UNITS: 100 INJECTION, SOLUTION INTRAVENOUS; SUBCUTANEOUS at 20:53

## 2018-03-05 RX ADMIN — SODIUM CHLORIDE, PRESERVATIVE FREE 1 ML: 5 INJECTION INTRAVENOUS at 21:37

## 2018-03-05 RX ADMIN — POLYETHYLENE GLYCOL 3350 1 PKT: 17 POWDER, FOR SOLUTION ORAL at 09:42

## 2018-03-05 RX ADMIN — POTASSIUM CHLORIDE 1 MEQ: 750 TABLET, EXTENDED RELEASE ORAL at 09:42

## 2018-03-05 RX ADMIN — ATORVASTATIN CALCIUM 1 MG: 20 TABLET, FILM COATED ORAL at 09:13

## 2018-03-05 RX ADMIN — INSULIN DETEMIR 1 UNITS: 100 INJECTION, SOLUTION SUBCUTANEOUS at 09:13

## 2018-03-05 RX ADMIN — ONDANSETRON 1 MG: 2 INJECTION INTRAMUSCULAR; INTRAVENOUS at 21:36

## 2018-03-05 RX ADMIN — DOCUSATE SODIUM,SENNOSIDES 1 TAB: 50; 8.6 TABLET, FILM COATED ORAL at 21:00

## 2018-03-05 RX ADMIN — BISACODYL 1 MG: 10 SUPPOSITORY RECTAL at 15:01

## 2018-03-05 RX ADMIN — INSULIN LISPRO 2 UNITS: 100 INJECTION, SOLUTION INTRAVENOUS; SUBCUTANEOUS at 09:14

## 2018-03-05 RX ADMIN — POLYETHYLENE GLYCOL 3350 1 PKT: 17 POWDER, FOR SOLUTION ORAL at 21:00

## 2018-03-05 RX ADMIN — INSULIN LISPRO 4 UNITS: 100 INJECTION, SOLUTION INTRAVENOUS; SUBCUTANEOUS at 12:07

## 2018-03-05 RX ADMIN — DEXTROSE MONOHYDRATE 1 MG: 50 INJECTION, SOLUTION INTRAVENOUS at 17:39

## 2018-03-05 RX ADMIN — INSULIN LISPRO 1 UNITS: 100 INJECTION, SOLUTION INTRAVENOUS; SUBCUTANEOUS at 17:43

## 2018-03-06 LAB
ALBUMIN: 1.9 GM/DL (ref 3.2–5.2)
ANION GAP: 9 MEQ/L (ref 8–16)
BLOOD UREA NITROGEN: 35 MG/DL (ref 7–18)
CALCIUM LEVEL: 8.4 MG/DL (ref 8.8–10.2)
CARBON DIOXIDE LEVEL: 31 MEQ/L (ref 21–32)
CHLORIDE LEVEL: 100 MEQ/L (ref 98–107)
CREATININE FOR GFR: 1.51 MG/DL (ref 0.7–1.3)
GFR SERPL CREATININE-BSD FRML MDRD: 47.3 ML/MIN/{1.73_M2} (ref 35–?)
GLUCOSE BLDC GLUCOMTR-MCNC: 193 MG/DL (ref 83–110)
GLUCOSE BLDC GLUCOMTR-MCNC: 86 MG/DL (ref 83–110)
GLUCOSE BLDC GLUCOMTR-MCNC: 95 MG/DL (ref 83–110)
GLUCOSE, FASTING: 106 MG/DL (ref 70–100)
HEMATOCRIT: 34.3 % (ref 42–52)
HEMOGLOBIN: 11.3 G/DL (ref 14–18)
MAGNESIUM LEVEL: 1.8 MG/DL (ref 1.8–2.4)
MEAN CORPUSCULAR HEMOGLOBIN: 26.5 PG (ref 27–33)
MEAN CORPUSCULAR HGB CONC: 32.9 G/DL (ref 32–36.5)
MEAN CORPUSCULAR VOLUME: 80.3 FL (ref 80–96)
NRBC BLD AUTO-RTO: 0.2 % (ref 0–0)
PHOSPHORUS LEVEL: 3.7 MG/DL (ref 2.5–4.9)
PLATELET COUNT, AUTOMATED: 514 10^3/UL (ref 150–450)
POTASSIUM SERUM: 3.5 MEQ/L (ref 3.5–5.1)
RED BLOOD COUNT: 4.27 10^6/UL (ref 4.3–6.1)
RED CELL DISTRIBUTION WIDTH: 15.9 % (ref 11.5–14.5)
SODIUM LEVEL: 140 MEQ/L (ref 136–145)
WHITE BLOOD COUNT: 13.3 10^3/UL (ref 4–10)

## 2018-03-06 RX ADMIN — POLYETHYLENE GLYCOL 3350 1 PKT: 17 POWDER, FOR SOLUTION ORAL at 10:06

## 2018-03-06 RX ADMIN — INSULIN LISPRO 1 UNITS: 100 INJECTION, SOLUTION INTRAVENOUS; SUBCUTANEOUS at 07:30

## 2018-03-06 RX ADMIN — POTASSIUM CHLORIDE 1 MEQ: 750 TABLET, EXTENDED RELEASE ORAL at 10:06

## 2018-03-06 RX ADMIN — DOCUSATE SODIUM,SENNOSIDES 1 TAB: 50; 8.6 TABLET, FILM COATED ORAL at 10:07

## 2018-03-06 RX ADMIN — SODIUM CHLORIDE, PRESERVATIVE FREE 1 ML: 5 INJECTION INTRAVENOUS at 05:45

## 2018-03-06 RX ADMIN — INSULIN LISPRO 1 UNITS: 100 INJECTION, SOLUTION INTRAVENOUS; SUBCUTANEOUS at 13:35

## 2018-03-06 RX ADMIN — INSULIN DETEMIR 1 UNITS: 100 INJECTION, SOLUTION SUBCUTANEOUS at 09:00

## 2018-03-21 ENCOUNTER — HOSPITAL ENCOUNTER (OUTPATIENT)
Dept: HOSPITAL 53 - M SMT | Age: 83
End: 2018-03-21
Attending: UROLOGY
Payer: COMMERCIAL

## 2018-03-21 DIAGNOSIS — Z85.51: Primary | ICD-10-CM

## 2018-03-21 LAB
ANION GAP: 8 MEQ/L (ref 8–16)
BLOOD UREA NITROGEN: 14 MG/DL (ref 7–18)
CALCIUM LEVEL: 8.8 MG/DL (ref 8.8–10.2)
CARBON DIOXIDE LEVEL: 26 MEQ/L (ref 21–32)
CHLORIDE LEVEL: 107 MEQ/L (ref 98–107)
CREATININE FOR GFR: 1.11 MG/DL (ref 0.7–1.3)
GFR SERPL CREATININE-BSD FRML MDRD: > 60 ML/MIN/{1.73_M2} (ref 35–?)
GLUCOSE, FASTING: 104 MG/DL (ref 70–100)
HEMATOCRIT: 38.9 % (ref 42–52)
HEMOGLOBIN: 12.2 G/DL (ref 14–18)
MEAN CORPUSCULAR HEMOGLOBIN: 26.1 PG (ref 27–33)
MEAN CORPUSCULAR HGB CONC: 31.4 G/DL (ref 32–36.5)
MEAN CORPUSCULAR VOLUME: 83.3 FL (ref 80–96)
NRBC BLD AUTO-RTO: 0 % (ref 0–0)
PLATELET COUNT, AUTOMATED: 526 10^3/UL (ref 150–450)
POTASSIUM SERUM: 4.7 MEQ/L (ref 3.5–5.1)
RED BLOOD COUNT: 4.67 10^6/UL (ref 4.3–6.1)
RED CELL DISTRIBUTION WIDTH: 17.2 % (ref 11.5–14.5)
SODIUM LEVEL: 141 MEQ/L (ref 136–145)
WHITE BLOOD COUNT: 8.9 10^3/UL (ref 4–10)

## 2018-03-21 PROCEDURE — 80048 BASIC METABOLIC PNL TOTAL CA: CPT

## 2018-04-20 ENCOUNTER — HOSPITAL ENCOUNTER (OUTPATIENT)
Dept: HOSPITAL 53 - M SFHCPLAZ | Age: 83
End: 2018-04-20
Attending: INTERNAL MEDICINE
Payer: COMMERCIAL

## 2018-04-20 DIAGNOSIS — Z79.4: Primary | ICD-10-CM

## 2018-04-20 DIAGNOSIS — E78.5: ICD-10-CM

## 2018-04-20 DIAGNOSIS — Z85.51: ICD-10-CM

## 2018-04-20 DIAGNOSIS — E11.9: ICD-10-CM

## 2018-04-20 LAB
ALBUMIN/GLOBULIN RATIO: 0.97 (ref 1–1.93)
ALBUMIN: 3.5 GM/DL (ref 3.2–5.2)
ALKALINE PHOSPHATASE: 141 U/L (ref 45–117)
ALT SERPL W P-5'-P-CCNC: 13 U/L (ref 12–78)
ANION GAP: 7 MEQ/L (ref 8–16)
AST SERPL-CCNC: 8 U/L (ref 7–37)
BILIRUBIN,TOTAL: 0.5 MG/DL (ref 0.2–1)
BLOOD UREA NITROGEN: 13 MG/DL (ref 7–18)
CALCIUM LEVEL: 8.8 MG/DL (ref 8.8–10.2)
CARBON DIOXIDE LEVEL: 28 MEQ/L (ref 21–32)
CHLORIDE LEVEL: 106 MEQ/L (ref 98–107)
CHOLEST SERPL-MCNC: 107 MG/DL (ref ?–200)
CHOLESTEROL RISK RATIO: 3.06 (ref ?–5)
CREAT UR-MCNC: 166 MG/DL
CREATININE FOR GFR: 0.88 MG/DL (ref 0.7–1.3)
EST. AVERAGE GLUCOSE BLD GHB EST-MCNC: 157 MG/DL (ref 60–110)
GFR SERPL CREATININE-BSD FRML MDRD: > 60 ML/MIN/{1.73_M2} (ref 35–?)
GLUCOSE, FASTING: 141 MG/DL (ref 70–100)
HDLC SERPL-MCNC: 35 MG/DL (ref 40–?)
HEMATOCRIT: 39.7 % (ref 42–52)
HEMOGLOBIN: 12.6 G/DL (ref 13.5–17.5)
LDL CHOLESTEROL: 50.8 MG/DL (ref ?–100)
MEAN CORPUSCULAR HEMOGLOBIN: 26.4 PG (ref 27–33)
MEAN CORPUSCULAR HGB CONC: 31.7 G/DL (ref 32–36.5)
MEAN CORPUSCULAR VOLUME: 83.2 FL (ref 80–96)
MICROALBUMIN UR-MCNC: 56 MG/L
MICROALBUMIN/CREAT UR: 33.7 MCG/MG (ref 0–30)
NONHDLC SERPL-MCNC: 72 MG/DL
NRBC BLD AUTO-RTO: 0 % (ref 0–0)
PLATELET COUNT, AUTOMATED: 393 10^3/UL (ref 150–450)
POTASSIUM SERUM: 4.4 MEQ/L (ref 3.5–5.1)
RED BLOOD COUNT: 4.77 10^6/UL (ref 4.3–6.1)
RED CELL DISTRIBUTION WIDTH: 18.2 % (ref 11.5–14.5)
SODIUM LEVEL: 141 MEQ/L (ref 136–145)
TOTAL PROTEIN: 7.1 GM/DL (ref 6.4–8.2)
TRIGLYCERIDES LEVEL: 106 MG/DL (ref ?–150)
WHITE BLOOD COUNT: 8 10^3/UL (ref 4–10)

## 2018-04-20 PROCEDURE — 80053 COMPREHEN METABOLIC PANEL: CPT

## 2018-07-17 ENCOUNTER — HOSPITAL ENCOUNTER (OUTPATIENT)
Dept: HOSPITAL 53 - M SMT | Age: 83
End: 2018-07-17
Attending: UROLOGY
Payer: COMMERCIAL

## 2018-07-17 DIAGNOSIS — Z85.51: Primary | ICD-10-CM

## 2018-07-17 LAB
ANION GAP: 9 MEQ/L (ref 8–16)
BLOOD UREA NITROGEN: 11 MG/DL (ref 7–18)
CALCIUM LEVEL: 8.8 MG/DL (ref 8.8–10.2)
CARBON DIOXIDE LEVEL: 29 MEQ/L (ref 21–32)
CHLORIDE LEVEL: 108 MEQ/L (ref 98–107)
CREATININE FOR GFR: 0.91 MG/DL (ref 0.7–1.3)
GFR SERPL CREATININE-BSD FRML MDRD: > 60 ML/MIN/{1.73_M2} (ref 35–?)
GLUCOSE, FASTING: 104 MG/DL (ref 70–100)
HEMATOCRIT: 39.6 % (ref 42–52)
HEMOGLOBIN: 12.6 G/DL (ref 13.5–17.5)
MEAN CORPUSCULAR HEMOGLOBIN: 27.6 PG (ref 27–33)
MEAN CORPUSCULAR HGB CONC: 31.8 G/DL (ref 32–36.5)
MEAN CORPUSCULAR VOLUME: 86.8 FL (ref 80–96)
NRBC BLD AUTO-RTO: 0 % (ref 0–0)
PLATELET COUNT, AUTOMATED: 350 10^3/UL (ref 150–450)
POTASSIUM SERUM: 4.1 MEQ/L (ref 3.5–5.1)
RED BLOOD COUNT: 4.56 10^6/UL (ref 4.3–6.1)
RED CELL DISTRIBUTION WIDTH: 14.4 % (ref 11.5–14.5)
SODIUM LEVEL: 146 MEQ/L (ref 136–145)
WHITE BLOOD COUNT: 5.6 10^3/UL (ref 4–10)

## 2018-07-17 PROCEDURE — 80048 BASIC METABOLIC PNL TOTAL CA: CPT

## 2018-10-26 ENCOUNTER — HOSPITAL ENCOUNTER (OUTPATIENT)
Dept: HOSPITAL 53 - M SFHCPLAZ | Age: 83
End: 2018-10-26
Attending: INTERNAL MEDICINE
Payer: COMMERCIAL

## 2018-10-26 DIAGNOSIS — Z85.51: Primary | ICD-10-CM

## 2018-10-26 DIAGNOSIS — E11.9: ICD-10-CM

## 2018-10-26 LAB
ALBUMIN/GLOBULIN RATIO: 1.06 (ref 1–1.93)
ALBUMIN: 3.5 GM/DL (ref 3.2–5.2)
ALKALINE PHOSPHATASE: 110 U/L (ref 45–117)
ALT SERPL W P-5'-P-CCNC: 16 U/L (ref 12–78)
ANION GAP: 11 MEQ/L (ref 8–16)
AST SERPL-CCNC: 8 U/L (ref 7–37)
BILIRUBIN,TOTAL: 0.5 MG/DL (ref 0.2–1)
BLOOD UREA NITROGEN: 20 MG/DL (ref 7–18)
CALCIUM LEVEL: 8.9 MG/DL (ref 8.8–10.2)
CARBON DIOXIDE LEVEL: 24 MEQ/L (ref 21–32)
CHLORIDE LEVEL: 107 MEQ/L (ref 98–107)
CREATININE FOR GFR: 1.05 MG/DL (ref 0.7–1.3)
EST. AVERAGE GLUCOSE BLD GHB EST-MCNC: 194 MG/DL (ref 60–110)
GFR SERPL CREATININE-BSD FRML MDRD: > 60 ML/MIN/{1.73_M2} (ref 35–?)
GLUCOSE, FASTING: 179 MG/DL (ref 70–100)
HEMATOCRIT: 41.1 % (ref 42–52)
HEMOGLOBIN: 13.2 G/DL (ref 13.5–17.5)
MEAN CORPUSCULAR HEMOGLOBIN: 28.7 PG (ref 27–33)
MEAN CORPUSCULAR HGB CONC: 32.1 G/DL (ref 32–36.5)
MEAN CORPUSCULAR VOLUME: 89.3 FL (ref 80–96)
NRBC BLD AUTO-RTO: 0 % (ref 0–0)
PLATELET COUNT, AUTOMATED: 293 10^3/UL (ref 150–450)
POTASSIUM SERUM: 4.4 MEQ/L (ref 3.5–5.1)
RED BLOOD COUNT: 4.6 10^6/UL (ref 4.3–6.1)
RED CELL DISTRIBUTION WIDTH: 16 % (ref 11.5–14.5)
SODIUM LEVEL: 142 MEQ/L (ref 136–145)
TOTAL PROTEIN: 6.8 GM/DL (ref 6.4–8.2)
WHITE BLOOD COUNT: 7.9 10^3/UL (ref 4–10)

## 2018-10-26 PROCEDURE — 80053 COMPREHEN METABOLIC PANEL: CPT

## 2019-02-20 ENCOUNTER — HOSPITAL ENCOUNTER (OUTPATIENT)
Dept: HOSPITAL 53 - M SMT | Age: 84
End: 2019-02-20
Attending: UROLOGY
Payer: MEDICARE

## 2019-02-20 DIAGNOSIS — Z85.51: Primary | ICD-10-CM

## 2019-02-20 PROCEDURE — 88108 CYTOPATH CONCENTRATE TECH: CPT

## 2019-03-01 ENCOUNTER — HOSPITAL ENCOUNTER (OUTPATIENT)
Dept: HOSPITAL 53 - M SMT | Age: 84
End: 2019-03-01
Attending: UROLOGY
Payer: MEDICARE

## 2019-03-01 DIAGNOSIS — Z85.51: Primary | ICD-10-CM

## 2019-03-01 LAB
BUN SERPL-MCNC: 17 MG/DL (ref 7–18)
CALCIUM SERPL-MCNC: 8.8 MG/DL (ref 8.8–10.2)
CHLORIDE SERPL-SCNC: 108 MEQ/L (ref 98–107)
CO2 SERPL-SCNC: 25 MEQ/L (ref 21–32)
CREAT SERPL-MCNC: 1.09 MG/DL (ref 0.7–1.3)
GFR SERPL CREATININE-BSD FRML MDRD: > 60 ML/MIN/{1.73_M2} (ref 35–?)
GLUCOSE SERPL-MCNC: 152 MG/DL (ref 70–100)
HCT VFR BLD AUTO: 43.6 % (ref 42–52)
HGB BLD-MCNC: 14.1 G/DL (ref 13.5–17.5)
MCH RBC QN AUTO: 29.8 PG (ref 27–33)
MCHC RBC AUTO-ENTMCNC: 32.3 G/DL (ref 32–36.5)
MCV RBC AUTO: 92.2 FL (ref 80–96)
PLATELET # BLD AUTO: 285 10^3/UL (ref 150–450)
POTASSIUM SERPL-SCNC: 5.1 MEQ/L (ref 3.5–5.1)
RBC # BLD AUTO: 4.73 10^6/UL (ref 4.3–6.1)
SODIUM SERPL-SCNC: 141 MEQ/L (ref 136–145)
WBC # BLD AUTO: 8 10^3/UL (ref 4–10)

## 2019-03-21 ENCOUNTER — HOSPITAL ENCOUNTER (OUTPATIENT)
Dept: HOSPITAL 53 - M RAD | Age: 84
End: 2019-03-21
Attending: UROLOGY
Payer: MEDICARE

## 2019-03-21 DIAGNOSIS — K80.20: ICD-10-CM

## 2019-03-21 DIAGNOSIS — K57.90: ICD-10-CM

## 2019-03-21 DIAGNOSIS — I70.0: ICD-10-CM

## 2019-03-21 DIAGNOSIS — Z85.51: Primary | ICD-10-CM

## 2019-03-21 DIAGNOSIS — Z93.59: ICD-10-CM

## 2019-03-21 PROCEDURE — 74178 CT ABD&PLV WO CNTR FLWD CNTR: CPT

## 2019-03-22 NOTE — REP
Clinical:  History of bladder cancer.

 

Technique:  Axial precontrast, contrast enhanced, and delayed images of the

abdomen and pelvis using 100 ml Isovue 370 intravenous contrast material with

coronal and sagittal re-formations.

 

Comparison:  12/13/2017.

 

Findings:

The patient is noted to be status post bladder resection and diverting urostomy

via the right anterior abdominal wall which appears essentially normal.  The

kidneys demonstrate chronic age-related atrophic changes without perinephric

stranding, nephrolithiasis, renal cystic or mass lesion, and no evidence for

hydroureteronephrosis. No recurrent mass lesion or adenopathy appreciated.

 

Liver, spleen, pancreas, and bilateral adrenal glands are normal.  Cholelithiasis

noted without acute cholecystitis.  The enteric system demonstrates

diverticulosis without acute diverticulitis and no evidence for bowel obstruction

or acute inflammatory process.  Pelvis demonstrates prior bladder and prostate

resection.  No pelvic fluid, adenopathy or mass.  No intraperitoneal or

retroperitoneal adenopathy.  No ascites.  No free air.  Atherosclerotic changes

of the aorta and vasculature noted without aneurysm or dissection.

Musculoskeletal structures demonstrate degenerative changes without focal osseous

abnormality.

 

Lung bases demonstrate moderate subpleural fibrosis and mild bronchiectasis.

 

Impression:

1.  Evidence for prior bladder resection with relatively normal appearing

diverting urostomy via the right anterior abdominal wall.  No

hydroureteronephrosis.

2.  No evidence for recurrence, metastatic disease, adenopathy, or ascites.

3.  Diverticulosis.

4.  Cholelithiasis.

 

 

Electronically Signed by

Clyde Lockwood MD 03/22/2019 04:22 P

## 2019-06-19 ENCOUNTER — HOSPITAL ENCOUNTER (OUTPATIENT)
Dept: HOSPITAL 53 - M SFHCPLAZ | Age: 84
End: 2019-06-19
Attending: INTERNAL MEDICINE
Payer: MEDICARE

## 2019-06-19 DIAGNOSIS — Z85.51: Primary | ICD-10-CM

## 2019-06-19 DIAGNOSIS — E11.9: ICD-10-CM

## 2019-06-19 DIAGNOSIS — E78.5: ICD-10-CM

## 2019-06-19 LAB
ALBUMIN SERPL BCG-MCNC: 3.6 GM/DL (ref 3.2–5.2)
ALT SERPL W P-5'-P-CCNC: 22 U/L (ref 12–78)
BILIRUB SERPL-MCNC: 0.4 MG/DL (ref 0.2–1)
BUN SERPL-MCNC: 16 MG/DL (ref 7–18)
CALCIUM SERPL-MCNC: 8.8 MG/DL (ref 8.8–10.2)
CHLORIDE SERPL-SCNC: 108 MEQ/L (ref 98–107)
CHOLEST SERPL-MCNC: 118 MG/DL (ref ?–200)
CHOLEST/HDLC SERPL: 3.81 {RATIO} (ref ?–5)
CO2 SERPL-SCNC: 26 MEQ/L (ref 21–32)
CREAT SERPL-MCNC: 1.14 MG/DL (ref 0.7–1.3)
CREAT UR-MCNC: 106 MG/DL
EST. AVERAGE GLUCOSE BLD GHB EST-MCNC: 223 MG/DL (ref 60–110)
GFR SERPL CREATININE-BSD FRML MDRD: > 60 ML/MIN/{1.73_M2} (ref 35–?)
GLUCOSE SERPL-MCNC: 208 MG/DL (ref 70–100)
HCT VFR BLD AUTO: 42.6 % (ref 42–52)
HDLC SERPL-MCNC: 31 MG/DL (ref 40–?)
HGB BLD-MCNC: 13.7 G/DL (ref 13.5–17.5)
LDLC SERPL CALC-MCNC: 49 MG/DL (ref ?–100)
MCH RBC QN AUTO: 29.8 PG (ref 27–33)
MCHC RBC AUTO-ENTMCNC: 32.2 G/DL (ref 32–36.5)
MCV RBC AUTO: 92.8 FL (ref 80–96)
MICROALBUMIN UR-MCNC: 86.9 MG/L
MICROALBUMIN/CREAT UR: 81.9 MCG/MG (ref 0–30)
NONHDLC SERPL-MCNC: 87 MG/DL
PLATELET # BLD AUTO: 266 10^3/UL (ref 150–450)
POTASSIUM SERPL-SCNC: 4.1 MEQ/L (ref 3.5–5.1)
PROT SERPL-MCNC: 6.8 GM/DL (ref 6.4–8.2)
RBC # BLD AUTO: 4.59 10^6/UL (ref 4.3–6.1)
SODIUM SERPL-SCNC: 141 MEQ/L (ref 136–145)
TRIGL SERPL-MCNC: 190 MG/DL (ref ?–150)
WBC # BLD AUTO: 7.4 10^3/UL (ref 4–10)

## 2019-08-20 ENCOUNTER — HOSPITAL ENCOUNTER (OUTPATIENT)
Dept: HOSPITAL 53 - M SMT | Age: 84
End: 2019-08-20
Attending: UROLOGY
Payer: MEDICARE

## 2019-08-20 DIAGNOSIS — Z85.51: Primary | ICD-10-CM

## 2019-08-20 PROCEDURE — 88108 CYTOPATH CONCENTRATE TECH: CPT

## 2019-09-17 ENCOUNTER — HOSPITAL ENCOUNTER (OUTPATIENT)
Dept: HOSPITAL 53 - M SFHCPLAZ | Age: 84
End: 2019-09-17
Attending: INTERNAL MEDICINE
Payer: MEDICARE

## 2019-09-17 DIAGNOSIS — Z98.890: Primary | ICD-10-CM

## 2019-09-17 DIAGNOSIS — Z85.51: ICD-10-CM

## 2019-09-17 DIAGNOSIS — E11.9: ICD-10-CM

## 2019-09-17 LAB
ALBUMIN SERPL BCG-MCNC: 3.6 GM/DL (ref 3.2–5.2)
ALT SERPL W P-5'-P-CCNC: 16 U/L (ref 12–78)
BILIRUB SERPL-MCNC: 0.5 MG/DL (ref 0.2–1)
BUN SERPL-MCNC: 14 MG/DL (ref 7–18)
CALCIUM SERPL-MCNC: 9.4 MG/DL (ref 8.8–10.2)
CHLORIDE SERPL-SCNC: 110 MEQ/L (ref 98–107)
CO2 SERPL-SCNC: 25 MEQ/L (ref 21–32)
CREAT SERPL-MCNC: 1.04 MG/DL (ref 0.7–1.3)
CREAT UR-MCNC: 98.1 MG/DL
EST. AVERAGE GLUCOSE BLD GHB EST-MCNC: 209 MG/DL (ref 60–110)
GFR SERPL CREATININE-BSD FRML MDRD: > 60 ML/MIN/{1.73_M2} (ref 35–?)
GLUCOSE SERPL-MCNC: 195 MG/DL (ref 70–100)
HCT VFR BLD AUTO: 43.8 % (ref 42–52)
HGB BLD-MCNC: 14.3 G/DL (ref 13.5–17.5)
MCH RBC QN AUTO: 31 PG (ref 27–33)
MCHC RBC AUTO-ENTMCNC: 32.6 G/DL (ref 32–36.5)
MCV RBC AUTO: 95 FL (ref 80–96)
MICROALBUMIN UR-MCNC: 118 MG/L
MICROALBUMIN/CREAT UR: 120.2 MCG/MG (ref 0–30)
PLATELET # BLD AUTO: 270 10^3/UL (ref 150–450)
POTASSIUM SERPL-SCNC: 4.2 MEQ/L (ref 3.5–5.1)
PROT SERPL-MCNC: 6.6 GM/DL (ref 6.4–8.2)
RBC # BLD AUTO: 4.61 10^6/UL (ref 4.3–6.1)
SODIUM SERPL-SCNC: 142 MEQ/L (ref 136–145)
WBC # BLD AUTO: 6.6 10^3/UL (ref 4–10)

## 2020-01-27 ENCOUNTER — HOSPITAL ENCOUNTER (OUTPATIENT)
Dept: HOSPITAL 53 - M SFHCPLAZ | Age: 85
End: 2020-01-27
Attending: INTERNAL MEDICINE
Payer: MEDICARE

## 2020-01-27 DIAGNOSIS — E78.5: ICD-10-CM

## 2020-01-27 DIAGNOSIS — E11.9: Primary | ICD-10-CM

## 2020-01-27 LAB
ALBUMIN SERPL BCG-MCNC: 4.1 GM/DL (ref 3.2–5.2)
ALT SERPL W P-5'-P-CCNC: 18 U/L (ref 12–78)
BILIRUB SERPL-MCNC: 0.5 MG/DL (ref 0.2–1)
BUN SERPL-MCNC: 18 MG/DL (ref 7–18)
CALCIUM SERPL-MCNC: 9.2 MG/DL (ref 8.8–10.2)
CHLORIDE SERPL-SCNC: 107 MEQ/L (ref 98–107)
CHOLEST SERPL-MCNC: 129 MG/DL (ref ?–200)
CHOLEST/HDLC SERPL: 3.79 {RATIO} (ref ?–5)
CO2 SERPL-SCNC: 29 MEQ/L (ref 21–32)
CREAT SERPL-MCNC: 1.23 MG/DL (ref 0.7–1.3)
EST. AVERAGE GLUCOSE BLD GHB EST-MCNC: 192 MG/DL (ref 60–110)
GFR SERPL CREATININE-BSD FRML MDRD: 59.7 ML/MIN/{1.73_M2} (ref 35–?)
GLUCOSE SERPL-MCNC: 169 MG/DL (ref 70–100)
HDLC SERPL-MCNC: 34 MG/DL (ref 40–?)
LDLC SERPL CALC-MCNC: 66 MG/DL (ref ?–100)
NONHDLC SERPL-MCNC: 95 MG/DL
POTASSIUM SERPL-SCNC: 4.7 MEQ/L (ref 3.5–5.1)
PROT SERPL-MCNC: 7.3 GM/DL (ref 6.4–8.2)
SODIUM SERPL-SCNC: 140 MEQ/L (ref 136–145)
TRIGL SERPL-MCNC: 144 MG/DL (ref ?–150)

## 2020-01-27 PROCEDURE — 83036 HEMOGLOBIN GLYCOSYLATED A1C: CPT

## 2020-01-27 PROCEDURE — 80061 LIPID PANEL: CPT

## 2020-01-27 PROCEDURE — 80053 COMPREHEN METABOLIC PANEL: CPT

## 2020-01-27 PROCEDURE — 36415 COLL VENOUS BLD VENIPUNCTURE: CPT

## 2020-02-19 ENCOUNTER — HOSPITAL ENCOUNTER (OUTPATIENT)
Dept: HOSPITAL 53 - M RAD | Age: 85
End: 2020-02-19
Attending: UROLOGY
Payer: MEDICARE

## 2020-02-19 DIAGNOSIS — K80.20: ICD-10-CM

## 2020-02-19 DIAGNOSIS — K57.30: Primary | ICD-10-CM

## 2020-02-19 DIAGNOSIS — Z98.890: ICD-10-CM

## 2020-02-19 DIAGNOSIS — R91.8: ICD-10-CM

## 2020-02-19 DIAGNOSIS — Z85.51: ICD-10-CM

## 2020-02-19 PROCEDURE — 74178 CT ABD&PLV WO CNTR FLWD CNTR: CPT

## 2020-02-19 NOTE — REP
Clinical:  History of bladder cancer.  Restaging.

 

Technique:  Axial precontrast, contrast enhanced, and delayed images of the

abdomen and pelvis using 100 ml Isovue 370 intravenous contrast material with

coronal and sagittal re-formations.

 

Comparison:  03/21/2019.

 

Findings:

The patient is again noted to be status post bladder resection with ileal

conduit/urostomy via the right anterior abdominal wall.  The kidneys demonstrate

age-related cortical thinning without perinephric stranding or obvious

abnormality and delayed images demonstrate normal symmetric excretion to the

collecting system without hydronephrosis.

 

Liver, spleen, pancreas, and bilateral adrenal glands are normal.  Cholelithiasis

again noted without evidence for acute cholecystitis.  The enteric system is

without obstruction or acute inflammatory process.  Normal terminal ileum and

cecum are identified in the right lower quadrant.  Sigmoid diverticulosis noted

without acute diverticulitis.

 

Pelvis demonstrates the patient to be status post bladder resection.  No

recurrent mass lesion or evidence for metastatic disease is appreciated.  No

pelvic, abdominal, or retroperitoneal adenopathy.  No ascites.  Atherosclerotic

changes to the aorta and vasculature noted without aneurysm or dissection.

Musculoskeletal structures demonstrate degenerative changes.  Lung bases

demonstrate a subtle reticulonodular interstitial pattern which is nonspecific

and similar to prior examination.

 

Impression:

1.  Status post bladder resection without evidence for recurrent or metastatic

disease within the abdomen and pelvis.

2.  Kidneys and ureters appear relatively normal via ileal conduit and urostomy

at the anterior abdominal wall.

3.  Cholelithiasis.

4.  Diverticulosis.

5.  Lung bases demonstrate a subtle reticulonodular interstitial pattern similar

to prior examination.  Complete chest CT for further investigation may be

warranted.

 

 

Electronically Signed by

Clyde Lockwood MD 02/19/2020 09:20 A

## 2020-03-16 ENCOUNTER — HOSPITAL ENCOUNTER (OUTPATIENT)
Dept: HOSPITAL 53 - M SMT | Age: 85
End: 2020-03-16
Attending: UROLOGY
Payer: MEDICARE

## 2020-03-16 DIAGNOSIS — Z85.51: Primary | ICD-10-CM

## 2020-03-16 PROCEDURE — 88108 CYTOPATH CONCENTRATE TECH: CPT

## 2020-10-22 ENCOUNTER — HOSPITAL ENCOUNTER (OUTPATIENT)
Dept: HOSPITAL 53 - M SMT | Age: 85
End: 2020-10-22
Attending: UROLOGY
Payer: MEDICARE

## 2020-10-22 DIAGNOSIS — C67.9: Primary | ICD-10-CM

## 2020-10-22 PROCEDURE — 88108 CYTOPATH CONCENTRATE TECH: CPT

## 2020-12-14 ENCOUNTER — HOSPITAL ENCOUNTER (OUTPATIENT)
Dept: HOSPITAL 53 - M SFHCPLAZ | Age: 85
End: 2020-12-14
Attending: INTERNAL MEDICINE
Payer: MEDICARE

## 2020-12-14 DIAGNOSIS — E11.9: Primary | ICD-10-CM

## 2020-12-14 DIAGNOSIS — Z11.59: ICD-10-CM

## 2020-12-14 DIAGNOSIS — Z85.51: ICD-10-CM

## 2020-12-14 LAB
ALBUMIN SERPL BCG-MCNC: 3.7 GM/DL (ref 3.2–5.2)
ALT SERPL W P-5'-P-CCNC: 22 U/L (ref 12–78)
BASOPHILS # BLD AUTO: 0.1 10^3/UL (ref 0–0.2)
BASOPHILS NFR BLD AUTO: 0.6 % (ref 0–1)
BILIRUB SERPL-MCNC: 0.4 MG/DL (ref 0.2–1)
BUN SERPL-MCNC: 16 MG/DL (ref 7–18)
CALCIUM SERPL-MCNC: 9.4 MG/DL (ref 8.8–10.2)
CHLORIDE SERPL-SCNC: 109 MEQ/L (ref 98–107)
CO2 SERPL-SCNC: 27 MEQ/L (ref 21–32)
CREAT SERPL-MCNC: 1.52 MG/DL (ref 0.7–1.3)
EOSINOPHIL # BLD AUTO: 0.1 10^3/UL (ref 0–0.5)
EOSINOPHIL NFR BLD AUTO: 0.8 % (ref 0–3)
EST. AVERAGE GLUCOSE BLD GHB EST-MCNC: 192 MG/DL (ref 60–110)
GFR SERPL CREATININE-BSD FRML MDRD: 46.6 ML/MIN/{1.73_M2} (ref 35–?)
GLUCOSE SERPL-MCNC: 274 MG/DL (ref 70–100)
HCT VFR BLD AUTO: 43.4 % (ref 42–52)
HGB BLD-MCNC: 13.7 G/DL (ref 13.5–17.5)
LYMPHOCYTES # BLD AUTO: 2.7 10^3/UL (ref 1.5–5)
LYMPHOCYTES NFR BLD AUTO: 29.4 % (ref 24–44)
MCH RBC QN AUTO: 29.3 PG (ref 27–33)
MCHC RBC AUTO-ENTMCNC: 31.6 G/DL (ref 32–36.5)
MCV RBC AUTO: 92.9 FL (ref 80–96)
MONOCYTES # BLD AUTO: 0.7 10^3/UL (ref 0–0.8)
MONOCYTES NFR BLD AUTO: 7.3 % (ref 0–5)
NEUTROPHILS # BLD AUTO: 5.5 10^3/UL (ref 1.5–8.5)
NEUTROPHILS NFR BLD AUTO: 60.9 % (ref 36–66)
PLATELET # BLD AUTO: 305 10^3/UL (ref 150–450)
POTASSIUM SERPL-SCNC: 4.9 MEQ/L (ref 3.5–5.1)
PROT SERPL-MCNC: 7.1 GM/DL (ref 6.4–8.2)
RBC # BLD AUTO: 4.67 10^6/UL (ref 4.3–6.1)
SODIUM SERPL-SCNC: 139 MEQ/L (ref 136–145)
WBC # BLD AUTO: 9 10^3/UL (ref 4–10)

## 2020-12-14 PROCEDURE — 85025 COMPLETE CBC W/AUTO DIFF WBC: CPT

## 2020-12-14 PROCEDURE — 36415 COLL VENOUS BLD VENIPUNCTURE: CPT

## 2020-12-14 PROCEDURE — 83036 HEMOGLOBIN GLYCOSYLATED A1C: CPT

## 2020-12-14 PROCEDURE — 80053 COMPREHEN METABOLIC PANEL: CPT

## 2021-01-05 ENCOUNTER — HOSPITAL ENCOUNTER (INPATIENT)
Dept: HOSPITAL 53 - M ED | Age: 86
LOS: 9 days | Discharge: NURSING, REHAB OR LONG-TERM CARE | DRG: 871 | End: 2021-01-14
Attending: INTERNAL MEDICINE | Admitting: GENERAL PRACTICE
Payer: MEDICARE

## 2021-01-05 VITALS — DIASTOLIC BLOOD PRESSURE: 62 MMHG | SYSTOLIC BLOOD PRESSURE: 128 MMHG

## 2021-01-05 VITALS — SYSTOLIC BLOOD PRESSURE: 125 MMHG | DIASTOLIC BLOOD PRESSURE: 74 MMHG

## 2021-01-05 VITALS — SYSTOLIC BLOOD PRESSURE: 134 MMHG | DIASTOLIC BLOOD PRESSURE: 66 MMHG

## 2021-01-05 VITALS — SYSTOLIC BLOOD PRESSURE: 137 MMHG | DIASTOLIC BLOOD PRESSURE: 68 MMHG

## 2021-01-05 VITALS — DIASTOLIC BLOOD PRESSURE: 65 MMHG | SYSTOLIC BLOOD PRESSURE: 139 MMHG

## 2021-01-05 VITALS — SYSTOLIC BLOOD PRESSURE: 130 MMHG | DIASTOLIC BLOOD PRESSURE: 76 MMHG

## 2021-01-05 VITALS — WEIGHT: 183.42 LBS | HEIGHT: 73 IN | BODY MASS INDEX: 24.31 KG/M2

## 2021-01-05 VITALS — DIASTOLIC BLOOD PRESSURE: 65 MMHG | SYSTOLIC BLOOD PRESSURE: 137 MMHG

## 2021-01-05 VITALS — SYSTOLIC BLOOD PRESSURE: 135 MMHG | DIASTOLIC BLOOD PRESSURE: 65 MMHG

## 2021-01-05 VITALS — DIASTOLIC BLOOD PRESSURE: 73 MMHG | SYSTOLIC BLOOD PRESSURE: 140 MMHG

## 2021-01-05 VITALS — SYSTOLIC BLOOD PRESSURE: 121 MMHG | DIASTOLIC BLOOD PRESSURE: 63 MMHG

## 2021-01-05 VITALS — DIASTOLIC BLOOD PRESSURE: 67 MMHG | SYSTOLIC BLOOD PRESSURE: 139 MMHG

## 2021-01-05 VITALS — SYSTOLIC BLOOD PRESSURE: 144 MMHG | DIASTOLIC BLOOD PRESSURE: 76 MMHG

## 2021-01-05 DIAGNOSIS — A41.9: Primary | ICD-10-CM

## 2021-01-05 DIAGNOSIS — N18.2: ICD-10-CM

## 2021-01-05 DIAGNOSIS — J12.89: ICD-10-CM

## 2021-01-05 DIAGNOSIS — Z79.899: ICD-10-CM

## 2021-01-05 DIAGNOSIS — R09.02: ICD-10-CM

## 2021-01-05 DIAGNOSIS — B96.1: ICD-10-CM

## 2021-01-05 DIAGNOSIS — Z98.41: ICD-10-CM

## 2021-01-05 DIAGNOSIS — U07.1: ICD-10-CM

## 2021-01-05 DIAGNOSIS — Z93.6: ICD-10-CM

## 2021-01-05 DIAGNOSIS — E11.22: ICD-10-CM

## 2021-01-05 DIAGNOSIS — T83.518A: ICD-10-CM

## 2021-01-05 DIAGNOSIS — B95.61: ICD-10-CM

## 2021-01-05 DIAGNOSIS — M62.82: ICD-10-CM

## 2021-01-05 DIAGNOSIS — E86.0: ICD-10-CM

## 2021-01-05 DIAGNOSIS — E11.10: ICD-10-CM

## 2021-01-05 DIAGNOSIS — Z79.4: ICD-10-CM
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
## 2021-01-05 DIAGNOSIS — I50.32: ICD-10-CM

## 2021-01-05 DIAGNOSIS — Z79.82: ICD-10-CM

## 2021-01-05 DIAGNOSIS — E87.0: ICD-10-CM

## 2021-01-05 DIAGNOSIS — Z98.42: ICD-10-CM

## 2021-01-05 DIAGNOSIS — K21.9: ICD-10-CM

## 2021-01-05 DIAGNOSIS — Z85.51: ICD-10-CM

## 2021-01-05 DIAGNOSIS — E87.5: ICD-10-CM

## 2021-01-05 DIAGNOSIS — E78.5: ICD-10-CM

## 2021-01-05 LAB
ALBUMIN SERPL BCG-MCNC: 3.1 GM/DL (ref 3.2–5.2)
ALT SERPL W P-5'-P-CCNC: 63 U/L (ref 12–78)
AMPHETAMINES UR QL SCN: NEGATIVE
B-OH-BUTYR SERPL-MCNC: > 46 MG/DL (ref ?–2.81)
BARBITURATES UR QL SCN: NEGATIVE
BASE EXCESS BLDA CALC-SCNC: -4.5 MMOL/L (ref -2–2)
BASE EXCESS BLDV CALC-SCNC: -14.5 MMOL/L (ref -2–2)
BASOPHILS # BLD AUTO: 0.1 10^3/UL (ref 0–0.2)
BASOPHILS NFR BLD AUTO: 0.5 % (ref 0–1)
BENZODIAZ UR QL SCN: NEGATIVE
BILIRUB CONJ SERPL-MCNC: 0.5 MG/DL (ref 0–0.2)
BILIRUB SERPL-MCNC: 0.9 MG/DL (ref 0.2–1)
BUN SERPL-MCNC: 45 MG/DL (ref 7–18)
BUN SERPL-MCNC: 47 MG/DL (ref 7–18)
BZE UR QL SCN: NEGATIVE
CALCIUM SERPL-MCNC: 8.7 MG/DL (ref 8.8–10.2)
CALCIUM SERPL-MCNC: 9.9 MG/DL (ref 8.8–10.2)
CANNABINOIDS UR QL SCN: NEGATIVE
CHLORIDE SERPL-SCNC: 112 MEQ/L (ref 98–107)
CHLORIDE SERPL-SCNC: 127 MEQ/L (ref 98–107)
CK MB CFR.DF SERPL CALC: 0.83
CK MB SERPL-MCNC: 24.1 NG/ML (ref ?–3.6)
CK SERPL-CCNC: 2919 U/L (ref 39–308)
CO2 BLDA CALC-SCNC: 19 MEQ/L (ref 23–31)
CO2 BLDV CALC-SCNC: 10.7 MEQ/L (ref 24–28)
CO2 SERPL-SCNC: 16 MEQ/L (ref 21–32)
CO2 SERPL-SCNC: 24 MEQ/L (ref 21–32)
CREAT SERPL-MCNC: 1.89 MG/DL (ref 0.7–1.3)
CREAT SERPL-MCNC: 2.14 MG/DL (ref 0.7–1.3)
EOSINOPHIL # BLD AUTO: 0 10^3/UL (ref 0–0.5)
EOSINOPHIL NFR BLD AUTO: 0 % (ref 0–3)
EST. AVERAGE GLUCOSE BLD GHB EST-MCNC: 209 MG/DL (ref 60–110)
EST. AVERAGE GLUCOSE BLD GHB EST-MCNC: 220 MG/DL (ref 60–110)
ETHANOL SERPL-MCNC: 0.01 % (ref 0–0.01)
GFR SERPL CREATININE-BSD FRML MDRD: 31.4 ML/MIN/{1.73_M2} (ref 35–?)
GFR SERPL CREATININE-BSD FRML MDRD: 36.3 ML/MIN/{1.73_M2} (ref 35–?)
GLUCOSE SERPL-MCNC: 243 MG/DL (ref 70–100)
GLUCOSE SERPL-MCNC: 611 MG/DL (ref 70–100)
HCO3 BLDA-SCNC: 18.2 MEQ/L (ref 22–26)
HCO3 BLDV-SCNC: 10 MEQ/L (ref 23–27)
HCO3 STD BLDA-SCNC: 20.8 MEQ/L (ref 22–26)
HCO3 STD BLDV-SCNC: 13.6 MEQ/L
HCT VFR BLD AUTO: 52.7 % (ref 42–52)
HGB BLD-MCNC: 15.4 G/DL (ref 13.5–17.5)
LIPASE SERPL-CCNC: 39 U/L (ref 73–393)
LYMPHOCYTES # BLD AUTO: 0.9 10^3/UL (ref 1.5–5)
LYMPHOCYTES NFR BLD AUTO: 7.3 % (ref 24–44)
MAGNESIUM SERPL-MCNC: 2.9 MG/DL (ref 1.8–2.4)
MCH RBC QN AUTO: 28.1 PG (ref 27–33)
MCHC RBC AUTO-ENTMCNC: 29.2 G/DL (ref 32–36.5)
MCV RBC AUTO: 96 FL (ref 80–96)
METHADONE UR QL SCN: NEGATIVE
MONOCYTES # BLD AUTO: 1 10^3/UL (ref 0–0.8)
MONOCYTES NFR BLD AUTO: 7.8 % (ref 0–5)
NEUTROPHILS # BLD AUTO: 10.2 10^3/UL (ref 1.5–8.5)
NEUTROPHILS NFR BLD AUTO: 83.3 % (ref 36–66)
OPIATES UR QL SCN: NEGATIVE
OSMOLALITY SERPL: 363 MOSM/KG (ref 280–301)
OSMOLALITY SERPL: 370 MOSM/KG (ref 280–301)
PCO2 BLDA: 27.9 MMHG (ref 35–45)
PCO2 BLDV: 22.4 MMHG (ref 38–50)
PCP UR QL SCN: NEGATIVE
PH BLDA: 7.43 UNITS (ref 7.35–7.45)
PH BLDV: 7.27 UNITS (ref 7.33–7.43)
PHOSPHATE SERPL-MCNC: 1 MG/DL (ref 2.5–4.9)
PLATELET # BLD AUTO: (no result) 10^3/UL (ref 150–450)
PO2 BLDA: 79 MMHG (ref 75–100)
PO2 BLDV: 82.6 MMHG (ref 30–50)
POTASSIUM SERPL-SCNC: 4.5 MEQ/L (ref 3.5–5.1)
POTASSIUM SERPL-SCNC: 5.5 MEQ/L (ref 3.5–5.1)
PROT SERPL-MCNC: 7.9 GM/DL (ref 6.4–8.2)
RBC # BLD AUTO: 5.49 10^6/UL (ref 4.3–6.1)
RSV RNA NPH QL NAA+PROBE: NEGATIVE
SAO2 % BLDA: 96.4 % (ref 95–99)
SAO2 % BLDV: 95 % (ref 60–80)
SODIUM SERPL-SCNC: 147 MEQ/L (ref 136–145)
SODIUM SERPL-SCNC: 159 MEQ/L (ref 136–145)
T4 FREE SERPL-MCNC: 1.45 NG/DL (ref 0.76–1.46)
TROPONIN I SERPL-MCNC: 0.03 NG/ML (ref ?–0.1)
TROPONIN I SERPL-MCNC: 0.07 NG/ML (ref ?–0.1)
TSH SERPL DL<=0.005 MIU/L-ACNC: 0.67 UIU/ML (ref 0.36–3.74)
WBC # BLD AUTO: 12.3 10^3/UL (ref 4–10)

## 2021-01-05 RX ADMIN — DEXTROSE MONOHYDRATE SCH MG: 50 INJECTION, SOLUTION INTRAVENOUS at 15:58

## 2021-01-05 RX ADMIN — Medication SCH: at 20:00

## 2021-01-05 RX ADMIN — LABETALOL HYDROCHLORIDE SCH MG: 100 TABLET, FILM COATED ORAL at 21:01

## 2021-01-05 RX ADMIN — SODIUM CHLORIDE SCH MLS/HR: 9 INJECTION, SOLUTION INTRAVENOUS at 15:30

## 2021-01-05 RX ADMIN — SODIUM CHLORIDE SCH MLS/HR: 9 INJECTION, SOLUTION INTRAVENOUS at 20:59

## 2021-01-05 RX ADMIN — Medication SCH: at 22:00

## 2021-01-05 RX ADMIN — Medication SCH: at 21:00

## 2021-01-05 RX ADMIN — LABETALOL HYDROCHLORIDE SCH MG: 100 TABLET, FILM COATED ORAL at 22:00

## 2021-01-05 NOTE — REP
INDICATION:

altered mental status



COMPARISON:

None.



TECHNIQUE:

Axial noncontrast images from the skull base to the thoracic inlet with coronal

reformations.



This CT examination was performed using the following dose reduction techniques:

Automated exposure control, adjustment of mA and/or kv according to the patient's

size, and use of iterative reconstruction technique.



FINDINGS:

Age-related atrophy with periventricular leukomalacia and microvascular ischemic

changes are appreciated. The ventricles and sulci are symmetric. Gray-white

differentiation is maintained.  There is no evidence for acute intracranial

hemorrhage, mass/mass effect, pathology or infarction.  No extra-axial fluid

collection.  Calvarium is intact.  Paranasal sinuses and mastoid air cells are clear.

Incidental 3 cm sebaceous cyst along the posterior midline aspect of the calvarium.



IMPRESSION:

Age related atrophy and microvascular ischemic changes.

No acute intracranial hemorrhage, infarction, or mass/mass effect.





<Electronically signed by Clyde Lockwood > 01/05/21 [id number]

## 2021-01-05 NOTE — HPEPDOC
[facility] Medical History & Physical


Date of Admission


2021


Date of Service:  2021





History and Physical


CHIEF COMPLAINT: Found in the back bedroom unable to get up





HISTORY OF PRESENT ILLNESS: 


85-year-old male who lives alone in a one-story home with  3 steps into the home

, and usually very active with past medical history significant for type 2 

diabetes, bladder CA status post robotic radical cystoscopy prostatectomy 

2018 with urostomy, chronic kidney disease stage III, hyperlipidemia, 

GRADE 1 LV diastolic dysfunction 2018 Echocardiogram, brought in by ambulance 

After being found by the patient's daughter and son-in-law in his home, unable 

to get up in the back bedroom lying on the floor unable to pull up his 

underwear, with a full urostomy bag lying in urine with his eyes open but her 

eyes were glazed.   Patient was known to be normal on 2021. He usually 

goes to the diner with his son-in-law. His son-in-law Abilio phone number [phone], came over to his house to check up on him since he was not answering his 

telephone since 2021 when his other daughter called him but he did not 

answer. Since the patient was not answering his son-in-law by telephone. His mom

called his wife for them to come into the home to check up on him. He was not 

answering the door and was not sitting in his rocking chair in the living room. 

They found him in the back bedroom with his urostomy bag full lying on the floor

in urine. They thought that he was hypoglycemic, so his daughter gave him peanut

butter in his mouth to swallow as well as glucagon. History of called EMS and he

was brought into the ER and found to be in diabetic ketoacidosis with acute on 

chronic renal failure due to acute rhabdomyolysis.CT head: negative for acute 

intracranial abnormality. cxr: no acute cardiopulmonary process.  Patient denies

any fever, chills, shortness of breath, chest pain, pressure or tightness, l

ightheadedness or dizziness. . He did not remember how he felt down or how long 

he was down on the ground. He denies any nausea, vomiting, abdominal pain, 

dysuria, urgency, frequency. Hospitalist was called to admit the patient for 

diabetic ketoacidosis, renal failure due to rhabdomyolysis.





PAST MEDICAL HISTORY:


type 2 diabetes, bladder CA status post robotic radical cystoscopy prostatectomy

2018 with urostomy, chronic kidney disease stage III, 

hyperlipidemia,GRADE 1 LV diastolic dysfunction 2018 Echocardiogram





PAST SURGICAL HISTORY:


Appendectomy at the age of 13. Right cataract surgery and left cataract surgery 

early colonoscopy. Transurethral resection of bladder tumor robotic radical 

cystoprostatectomy 2018





SOCIAL HISTORY:


 has daughter and son-in-law in the area.  lives in a one-story home with 3 

steps into the home. Denies any alcohol abuse, recreational drug use. Previously

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 Wife  2013 HIS own home





FAMILY HISTORY:


Mother and father are . Father had chronic kidney disease stage IV  

of cancer and renal disease is stage of 79. Mother  at 93 of CVA. He has one

brother, one sister and 2 daughters. One son committed suicide in 





ALLERGIES: Please see below.





REVIEW OF SYSTEMS:


12 point systems review negative aside from positive findings in HPI





HOME MEDICATIONS: Please see below. 





PHYSICAL EXAMINATION:


VITAL SIGNS: See below


GENERAL APPEARANCE: Disheveled, no respiratory distress. Poor dentition with 

missing teeth


HEENT: Dry mucous membranes. No JVD, thyromegaly or carotid bruit


CARDIOVASCULAR: S1, S2, sinus rhythm, no murmurs, rubs or gallops


LUNGS: Clear to auscultation. No wheezing, rales or rhonchi. Air entry is equal.

No scoliosis


ABDOMEN: Positive bowel sounds, soft, nontender, nondistended. No rebound or 

guarding. No hepatosplenomegaly. No abdominal bruit


Urostomy bag


EXTREMITIES: No cyanosis, clubbing or pitting edema





LABORATORY DATA: See below.





IMAGIN/5/21 CT HEAD


INDICATION:


altered mental status





COMPARISON:


None.





TECHNIQUE:


Axial noncontrast images from the skull base to the thoracic inlet with coronal


reformations.





This CT examination was performed using the following dose reduction techniques:


Automated exposure control, adjustment of mA and/or kv according to the 

patient's


size, and use of iterative reconstruction technique.





FINDINGS:


Age-related atrophy with periventricular leukomalacia and microvascular ischemic


changes are appreciated. The ventricles and sulci are symmetric. Gray-white


differentiation is maintained.  There is no evidence for acute intracranial


hemorrhage, mass/mass effect, pathology or infarction.  No extra-axial fluid


collection.  Calvarium is intact.  Paranasal sinuses and mastoid air cells are 

clear.


Incidental 3 cm sebaceous cyst along the posterior midline aspect of the 

calvarium.





IMPRESSION:


Age related atrophy and microvascular ischemic changes.


No acute intracranial hemorrhage, infarction, or mass/mass effect.








<Electronically signed by Clyde Lockwood > 21 1304





21 CXR:





INDICATION:


DKA.





COMPARISON:


2018.





TECHNIQUE:


SINGLE PORTABLE AP VIEW OF THE CHEST WAS PERFORMED.





FINDINGS:


There is no acute infiltrate or pulmonary edema.  The heart is normal in size.  

The


mediastinal silhouette is unchanged.  The visualized osseous structures are


unremarkable.





IMPRESSION:


NO ACUTE PULMONARY DISEASE.








<Electronically signed by Sarthak Parra > 21 1250








MICROBIOLOGY: Please see below. 





ASSESSMENT: 


85-year-old male who lives alone in a one-story home with  3 steps into the home

, and usually very active with past medical history significant for type 2 

diabetes, bladder CA status post robotic radical cystoscopy prostatectomy 

2018 with urostomy, chronic kidney disease stage III, hyperlipidemia, 

brought in by ambulance After being found by the patient's daughter and 

son-in-law in his home, unable to get up in the back bedroom lying on the floor 

unable to pull up his underwear, with a full urostomy bag lying in urine with 

his eyes open but her eyes were glazed.   Patient was known to be normal on 

2021. He usually goes to the diner with his son-in-law. His son-in-law 

Abilio phone number [phone], came over to his house to check up on him since he 

was not answering his telephone since 2021 when his other daughter called 

him but he did not answer. Since the patient was not answering his son-in-law by

telephone. His mom called his wife for them to come into the home to check up on

him. He was not answering the door and was not sitting in his rocking chair in 

the living room. They found him in the back bedroom with his urostomy bag full 

lying on the floor in urine. They thought that he was hypoglycemic, so his 

daughter gave him peanut butter in his mouth to swallow as well as glucagon. 

History of called EMS and he was brought into the ER and found to be in diabetic

ketoacidosis with acute on chronic renal failure due to acute rhabdomyolysis.CT 

head: negative for acute intracranial abnormality. cxr: no acute cardiopulmonary

process.  Patient denies any fever, chills, shortness of breath, chest pain, 

pressure or tightness, lightheadedness or dizziness. . He did not remember how 

he felt down or how long he was down on the ground. He denies any nausea, 

vomiting, abdominal pain, dysuria, urgency, frequency. Hospitalist was called to

admit the patient for diabetic ketoacidosis, renal failure due to 

rhabdomyolysis.





PLAN:





 Acute diabetic ketoacidosis


-Patient will be admitted as an inpatient to the intensive care unit with every 

hourly fingersticks. He had received intravenous regular insulin bolus and will 

be continued on maintenance until anion gap is closed and patient's acidemia has

resolved. He'll be kept nothing by mouth with intravenous fluids every 4 hourly,

BMP, magnesium and phosphorus replete electrolytes as needed. No signs of any 

acute infection, but will check UA, urine C&S, chest x-ray had no signs of acute

infection. No empiric antibiotics for now. Intravenous fluids. Check A1c.





Acute rhabdomyolysis on chronic kidney disease stage III


-Patient will be given intravenous fluids normal saline. Change to D5 once 

glucose is 250. Monitor I's and O's and daily weights. Monitor for respiratory 

distress. Avoid nephrotoxins. Renally dose all medications. Avoid all 

nonsteroidal anti-inflammatory medications





History of bladder tumor, status post TURBT robotic radical cystoscopy 

prostatectomy 2018 with urostomy


Urostomy care





Hyperkalemia 


secondary to rhabdomyolysis. Telemetry monitoring on insulin drip and repeat 

metabolic panel every 4 hourly to check for resolution





Dyslipidemia


 Chronic. Check lipid panel in the morning





Grade 1 LV diastolic CHF, compensated


appears euvolemic. will monitor i/o. daily weights and signs of respiratory 

distress. CXR clear. no LE edema or JVD on exam





Deconditioning and debility status post fall at home. ARU screen fall 

precautions. Activity as tolerated.





Diet nothing by mouth until acidosis resolves, then patient can be given 

consistent carbohydrate renal diet





DVT prophylaxis. Compression stockings





Disposition 3-4 days, pending ARU screen once medical issues are resolved





Vital Signs





Vital Signs








  Date Time  Temp Pulse Resp B/P (MAP) Pulse Ox O2 Delivery O2 Flow Rate FiO2


 


21 12:16 94.7       


 


21 12:15    156/82 (106)    


 


21 12:03  127 24  93   











Laboratory Data


Labs 24H


Laboratory Tests 2


21 12:50: 


Immature Granulocyte % (Auto) 1.1, Neutrophils (%) (Auto) 83.3H, Lymphocytes (%)

(Auto) 7.3L, Monocytes (%) (Auto) 7.8H, Eosinophils (%) (Auto) 0.0, Basophils 

(%) (Auto) 0.5, Neutrophils # (Auto) 10.2H, Lymphocytes # (Auto) 0.9L, Monocytes

# (Auto) 1.0H, Eosinophils # (Auto) 0.0, Basophils # (Auto) 0.1, Nucleated Red 

Blood Cells % (auto) 0.0, Blood Gas Bicarbonate Standard 13.6, Venous Blood pH 

7.269L, Venous Blood Partial Pressure CO2 22.4L, Venous Blood Partial Pressure 

O2 82.6H, Venous Blood Total Carbon Dioxide 10.7L, Venous Blood HCO3 10.0L, 

Venous Blood Oxygen Saturation 95.0H, Venous Blood Base Excess -14.5L, Anion Gap

19H, Glomerular Filtration Rate 31.4L, Estimated Mean Plasma Glucose 209H, 

Hemoglobin A1c 8.9, Osmolality 370H, Calcium Level 9.9, Total Bilirubin 0.9, 

Direct Bilirubin 0.5H, Aspartate Amino Transf (AST/SGOT) 77H, Alanine Aminotran

sferase (ALT/SGPT) 63, Alkaline Phosphatase 93, Total Creatine Kinase 2919H, 

Creatine Kinase MB 24.1H, Creatine Kinase MB Relative Index 0.83, Troponin I 

0.03, Total Protein 7.9, Albumin 3.1L, Albumin/Globulin Ratio 0.6, Lipase 39L, 

Thyroid Stimulating Hormone (TSH) 0.666, Free Thyroxine 1.45, Urine Opiates 

Screen NEGATIVE, Urine Methadone Screen NEGATIVE, Urine Barbiturates Screen 

NEGATIVE, Urine Phencyclidine Screen NEGATIVE, Urine Amphetamines Screen 

NEGATIVE, Urine Benzodiazepines Screen NEGATIVE, Urine Cocaine Metabolite Screen

NEGATIVE, Urine Cannabinoids Screen NEGATIVE, Ethyl Alcohol Level 0.005, B-

Hydroxybutyrate > 46.00H


CBC/BMP


Laboratory Tests


21 12:50











Home Medications


Scheduled


Aspirin (Aspirin EC) 81 Mg Tablet.dr, 81 MG PO DAILY


Atorvastatin Calcium (Atorvastatin Calcium) 40 Mg Tab, 20 MG PO Q2D


Glipizide (Glipizide) 10 Mg Tablet, 10 MG PO DAILY


Insulin Glargine (Lantus) 1 Units/0.01 Ml Susp, 70 UNITS SC DAILY


Metformin HCl (Metformin HCl ER) 750 Mg Tab, 1,500 MG PO QPM


Multivitamins (Thera M Plus Tablet) 1 Each Tablet, 1 TAB PO DAILY





Miscellaneous Medications


[Med Rec Comment]


   LIST OBTAINED FROM PT DAUGHTER, SHE STATES PT HAS NOT HAD MEDS IN APPROX 3 

DAYS 





Allergies


Coded Allergies:  


     No Known Allergies (Unverified , 18)





A-FIB/CHADSVASC


A-FIB History


Current/History of A-Fib/PAF?:  No


Current PO Anticoag Therapy:  No





Age/Risk Factor Scoring


CHADSVASC:  








CHADSVASC Response (Comments) Value


 


Age Risk Factor Age >/= 75 years old 2


 


Gender Risk Factor Male 0


 


Hx of CHF Yes 1


 


Hx of HTN No 0


 


Hx of Stroke/TIA/or VTE No 0


 


Hx of Diabetes Yes 1


 


Hx of Vascular Disease No 0


 


Total  4











Treatment


Treatment ordered:  NONE











ADRIANA DOWNS MD             2021 15:51

## 2021-01-05 NOTE — REPVR
PROCEDURE INFORMATION: 

Exam: US Retroperitoneal Limited, Kidneys 

Exam date and time: 1/5/2021 11:15 PM 

Age: 85 years old 

Clinical indication: Abnormal findings; Abnormal lab test; Abnormal kidney 

function lab tests; Prior surgery; Surgery date: 6+ months; Surgery type: 

Urostomy; Additional info: Acute on ckd3 



TECHNIQUE: 

Imaging protocol: Real-time ultrasound of the retroperitoneum with image 

documentation. Examination was focused on the kidneys. 



COMPARISON: 

CT ABD PELVIS W/O FOL BY WIT 2/19/2020 8:58 AM 



FINDINGS: 

Right kidney: The right kidney measures 12.0 cm in its cephalocaudad dimension 

and 6.8 x 6.2 cm in diameter. No mass, cyst or hydronephrosis. 

Left kidney: The left kidney measures 10.8 cm in its cephalocaudad dimension 

and 7.0 x 5.3 cm in diameter. No mass, cyst or hydronephrosis. 



IMPRESSION: 

Negative renal sonogram. 



Electronically signed by: Duncan Goodwin On 01/05/2021  23:32:57 PM

## 2021-01-05 NOTE — ECGEPIP
[facility] - ED

                                       

                                       Test Date:    2021

Pat Name:     ROSS NICHOLSON               Department:   

Patient ID:   [id number]                 Room:         -

Gender:       Male                     Technician:   adrianna

:          1935               Requested By: Maja Lundborg-Gray 

Order Number: [id number]     Reading MD:   Lida Clayton

                                 Measurements

Intervals                              Axis          

Rate:         126                      P:            39

KS:           146                      QRS:          18

QRSD:         78                       T:            52

QT:           292                                    

QTc:          423                                    

                           Interpretive Statements

SINUS TACHYCARDIA

POSSIBLE RIGHT VENTRICULAR CONDUCTION DELAY

ABNORMAL RHYTHM ECG

NSTTW abnormalities

baseline artifact may affect interpretation

No prior

Electronically Signed on 2021 17:41:32 EST by Lida Clayton

## 2021-01-05 NOTE — REP
INDICATION:

DKA.



COMPARISON:

02/27/2018.



TECHNIQUE:

SINGLE PORTABLE AP VIEW OF THE CHEST WAS PERFORMED.



FINDINGS:

There is no acute infiltrate or pulmonary edema.  The heart is normal in size.  The

mediastinal silhouette is unchanged.  The visualized osseous structures are

unremarkable.



IMPRESSION:

NO ACUTE PULMONARY DISEASE.





<Electronically signed by Sarthak Parra > 01/05/21 [id number]

## 2021-01-06 VITALS — SYSTOLIC BLOOD PRESSURE: 130 MMHG | DIASTOLIC BLOOD PRESSURE: 61 MMHG

## 2021-01-06 VITALS — DIASTOLIC BLOOD PRESSURE: 62 MMHG | SYSTOLIC BLOOD PRESSURE: 130 MMHG

## 2021-01-06 VITALS — DIASTOLIC BLOOD PRESSURE: 61 MMHG | SYSTOLIC BLOOD PRESSURE: 125 MMHG

## 2021-01-06 VITALS — SYSTOLIC BLOOD PRESSURE: 119 MMHG | DIASTOLIC BLOOD PRESSURE: 57 MMHG

## 2021-01-06 VITALS — DIASTOLIC BLOOD PRESSURE: 61 MMHG | SYSTOLIC BLOOD PRESSURE: 142 MMHG

## 2021-01-06 VITALS — DIASTOLIC BLOOD PRESSURE: 70 MMHG | SYSTOLIC BLOOD PRESSURE: 135 MMHG

## 2021-01-06 VITALS — DIASTOLIC BLOOD PRESSURE: 80 MMHG | SYSTOLIC BLOOD PRESSURE: 106 MMHG

## 2021-01-06 VITALS — DIASTOLIC BLOOD PRESSURE: 65 MMHG | SYSTOLIC BLOOD PRESSURE: 128 MMHG

## 2021-01-06 VITALS — DIASTOLIC BLOOD PRESSURE: 60 MMHG | SYSTOLIC BLOOD PRESSURE: 138 MMHG

## 2021-01-06 VITALS — SYSTOLIC BLOOD PRESSURE: 125 MMHG | DIASTOLIC BLOOD PRESSURE: 59 MMHG

## 2021-01-06 VITALS — DIASTOLIC BLOOD PRESSURE: 67 MMHG | SYSTOLIC BLOOD PRESSURE: 127 MMHG

## 2021-01-06 VITALS — DIASTOLIC BLOOD PRESSURE: 63 MMHG | SYSTOLIC BLOOD PRESSURE: 126 MMHG

## 2021-01-06 VITALS — SYSTOLIC BLOOD PRESSURE: 152 MMHG | DIASTOLIC BLOOD PRESSURE: 65 MMHG

## 2021-01-06 VITALS — DIASTOLIC BLOOD PRESSURE: 71 MMHG | SYSTOLIC BLOOD PRESSURE: 142 MMHG

## 2021-01-06 VITALS — DIASTOLIC BLOOD PRESSURE: 69 MMHG | SYSTOLIC BLOOD PRESSURE: 132 MMHG

## 2021-01-06 VITALS — SYSTOLIC BLOOD PRESSURE: 137 MMHG | DIASTOLIC BLOOD PRESSURE: 63 MMHG

## 2021-01-06 VITALS — SYSTOLIC BLOOD PRESSURE: 126 MMHG | DIASTOLIC BLOOD PRESSURE: 59 MMHG

## 2021-01-06 VITALS — DIASTOLIC BLOOD PRESSURE: 57 MMHG | SYSTOLIC BLOOD PRESSURE: 123 MMHG

## 2021-01-06 VITALS — DIASTOLIC BLOOD PRESSURE: 61 MMHG | SYSTOLIC BLOOD PRESSURE: 112 MMHG

## 2021-01-06 VITALS — DIASTOLIC BLOOD PRESSURE: 58 MMHG | SYSTOLIC BLOOD PRESSURE: 119 MMHG

## 2021-01-06 VITALS — DIASTOLIC BLOOD PRESSURE: 64 MMHG | SYSTOLIC BLOOD PRESSURE: 136 MMHG

## 2021-01-06 VITALS — SYSTOLIC BLOOD PRESSURE: 141 MMHG | DIASTOLIC BLOOD PRESSURE: 64 MMHG

## 2021-01-06 LAB
APTT BLD: 27.6 SECONDS (ref 24.2–38.5)
APTT BLD: 28.5 SECONDS (ref 24.2–38.5)
APTT BLD: 34.5 SECONDS (ref 24.2–38.5)
B-OH-BUTYR SERPL-MCNC: 14.33 MG/DL (ref ?–2.81)
B-OH-BUTYR SERPL-MCNC: 4.55 MG/DL (ref ?–2.81)
BASOPHILS # BLD AUTO: 0 10^3/UL (ref 0–0.2)
BASOPHILS # BLD AUTO: 0 10^3/UL (ref 0–0.2)
BASOPHILS NFR BLD AUTO: 0.1 % (ref 0–1)
BASOPHILS NFR BLD AUTO: 0.2 % (ref 0–1)
BUN SERPL-MCNC: 42 MG/DL (ref 7–18)
BUN SERPL-MCNC: 43 MG/DL (ref 7–18)
BUN SERPL-MCNC: 46 MG/DL (ref 7–18)
BUN SERPL-MCNC: 48 MG/DL (ref 7–18)
CALCIUM SERPL-MCNC: 7.9 MG/DL (ref 8.8–10.2)
CALCIUM SERPL-MCNC: 8.1 MG/DL (ref 8.8–10.2)
CALCIUM SERPL-MCNC: 8.4 MG/DL (ref 8.8–10.2)
CALCIUM SERPL-MCNC: 8.9 MG/DL (ref 8.8–10.2)
CHLORIDE SERPL-SCNC: 121 MEQ/L (ref 98–107)
CHLORIDE SERPL-SCNC: 122 MEQ/L (ref 98–107)
CHLORIDE SERPL-SCNC: 122 MEQ/L (ref 98–107)
CHLORIDE SERPL-SCNC: 129 MEQ/L (ref 98–107)
CHOLEST SERPL-MCNC: 117 MG/DL (ref ?–200)
CHOLEST/HDLC SERPL: 5.32 {RATIO} (ref ?–5)
CK MB CFR.DF SERPL CALC: 0.43
CK MB SERPL-MCNC: 48.1 NG/ML (ref ?–3.6)
CK SERPL-CCNC: (no result) U/L (ref 39–308)
CO2 SERPL-SCNC: 22 MEQ/L (ref 21–32)
CO2 SERPL-SCNC: 23 MEQ/L (ref 21–32)
CO2 SERPL-SCNC: 23 MEQ/L (ref 21–32)
CO2 SERPL-SCNC: 27 MEQ/L (ref 21–32)
CREAT SERPL-MCNC: 1.81 MG/DL (ref 0.7–1.3)
CREAT SERPL-MCNC: 1.91 MG/DL (ref 0.7–1.3)
CREAT SERPL-MCNC: 1.95 MG/DL (ref 0.7–1.3)
CREAT SERPL-MCNC: 1.98 MG/DL (ref 0.7–1.3)
CRP SERPL-MCNC: 18.3 MG/DL (ref 0–0.3)
CRP SERPL-MCNC: 19.4 MG/DL (ref 0–0.3)
D DIMER PPP DDU-MCNC: 2538.87 NG/ML (ref ?–500)
D DIMER PPP DDU-MCNC: > 4000 NG/ML (ref ?–500)
D DIMER PPP DDU-MCNC: > 4000 NG/ML (ref ?–500)
EOSINOPHIL # BLD AUTO: 0 10^3/UL (ref 0–0.5)
EOSINOPHIL # BLD AUTO: 0 10^3/UL (ref 0–0.5)
EOSINOPHIL NFR BLD AUTO: 0 % (ref 0–3)
EOSINOPHIL NFR BLD AUTO: 0 % (ref 0–3)
EST. AVERAGE GLUCOSE BLD GHB EST-MCNC: 214 MG/DL (ref 60–110)
FERRITIN SERPL-MCNC: 1003 NG/ML (ref 26–388)
FERRITIN SERPL-MCNC: 866 NG/ML (ref 26–388)
FIBRINOGEN PPP-MCNC: 641 MG/DL (ref 221–452)
FIBRINOGEN PPP-MCNC: 728 MG/DL (ref 221–452)
FIBRINOGEN PPP-MCNC: 830 MG/DL (ref 221–452)
GFR SERPL CREATININE-BSD FRML MDRD: 34.4 ML/MIN/{1.73_M2} (ref 35–?)
GFR SERPL CREATININE-BSD FRML MDRD: 35 ML/MIN/{1.73_M2} (ref 35–?)
GFR SERPL CREATININE-BSD FRML MDRD: 35.8 ML/MIN/{1.73_M2} (ref 35–?)
GFR SERPL CREATININE-BSD FRML MDRD: 38.1 ML/MIN/{1.73_M2} (ref 35–?)
GLUCOSE SERPL-MCNC: 207 MG/DL (ref 70–100)
GLUCOSE SERPL-MCNC: 380 MG/DL (ref 70–100)
GLUCOSE SERPL-MCNC: 387 MG/DL (ref 70–100)
GLUCOSE SERPL-MCNC: 526 MG/DL (ref 70–100)
HBV SURFACE AB SER-ACNC: NEGATIVE M[IU]/ML
HCT VFR BLD AUTO: 43.2 % (ref 42–52)
HCT VFR BLD AUTO: 44.3 % (ref 42–52)
HDLC SERPL-MCNC: 22 MG/DL (ref 40–?)
HGB BLD-MCNC: 13.1 G/DL (ref 13.5–17.5)
HGB BLD-MCNC: 13.6 G/DL (ref 13.5–17.5)
HIV 1+2 AB+HIV1 P24 AG SERPL QL IA: NEGATIVE
INR PPP: 1.28
INR PPP: 1.31
INR PPP: 1.4
LDH SERPL L TO P-CCNC: 494 U/L (ref 87–241)
LDH SERPL L TO P-CCNC: 522 U/L (ref 87–241)
LDLC SERPL CALC-MCNC: 50 MG/DL (ref ?–100)
LYMPHOCYTES # BLD AUTO: 0.7 10^3/UL (ref 1.5–5)
LYMPHOCYTES # BLD AUTO: 0.9 10^3/UL (ref 1.5–5)
LYMPHOCYTES NFR BLD AUTO: 5.9 % (ref 24–44)
LYMPHOCYTES NFR BLD AUTO: 7.2 % (ref 24–44)
MAGNESIUM SERPL-MCNC: 2.7 MG/DL (ref 1.8–2.4)
MCH RBC QN AUTO: 28 PG (ref 27–33)
MCH RBC QN AUTO: 29.6 PG (ref 27–33)
MCHC RBC AUTO-ENTMCNC: 29.6 G/DL (ref 32–36.5)
MCHC RBC AUTO-ENTMCNC: 31.5 G/DL (ref 32–36.5)
MCV RBC AUTO: 94.1 FL (ref 80–96)
MCV RBC AUTO: 94.7 FL (ref 80–96)
MONOCYTES # BLD AUTO: 0.4 10^3/UL (ref 0–0.8)
MONOCYTES # BLD AUTO: 0.9 10^3/UL (ref 0–0.8)
MONOCYTES NFR BLD AUTO: 3.2 % (ref 0–5)
MONOCYTES NFR BLD AUTO: 7 % (ref 0–5)
NEUTROPHILS # BLD AUTO: 10.3 10^3/UL (ref 1.5–8.5)
NEUTROPHILS # BLD AUTO: 10.5 10^3/UL (ref 1.5–8.5)
NEUTROPHILS NFR BLD AUTO: 84.3 % (ref 36–66)
NEUTROPHILS NFR BLD AUTO: 89.3 % (ref 36–66)
NONHDLC SERPL-MCNC: 95 MG/DL
NT-PRO BNP: 391 PG/ML (ref ?–450)
NT-PRO BNP: 392 PG/ML (ref ?–450)
NT-PRO BNP: 488 PG/ML (ref ?–450)
OSMOLALITY SERPL: 354 MOSM/KG (ref 280–301)
PHOSPHATE SERPL-MCNC: 1 MG/DL (ref 2.5–4.9)
PHOSPHATE SERPL-MCNC: 4.9 MG/DL (ref 2.5–4.9)
PLATELET # BLD AUTO: 309 10^3/UL (ref 150–450)
PLATELET # BLD AUTO: 352 10^3/UL (ref 150–450)
POTASSIUM SERPL-SCNC: 4.6 MEQ/L (ref 3.5–5.1)
POTASSIUM SERPL-SCNC: 4.7 MEQ/L (ref 3.5–5.1)
POTASSIUM SERPL-SCNC: 5.1 MEQ/L (ref 3.5–5.1)
POTASSIUM SERPL-SCNC: 5.2 MEQ/L (ref 3.5–5.1)
PROTHROMBIN TIME: 16.3 SECONDS (ref 12.5–14.3)
PROTHROMBIN TIME: 16.6 SECONDS (ref 12.5–14.3)
PROTHROMBIN TIME: 17.5 SECONDS (ref 12.5–14.3)
RBC # BLD AUTO: 4.59 10^6/UL (ref 4.3–6.1)
RBC # BLD AUTO: 4.68 10^6/UL (ref 4.3–6.1)
SODIUM SERPL-SCNC: 148 MEQ/L (ref 136–145)
SODIUM SERPL-SCNC: 149 MEQ/L (ref 136–145)
SODIUM SERPL-SCNC: 151 MEQ/L (ref 136–145)
SODIUM SERPL-SCNC: 159 MEQ/L (ref 136–145)
TRIGL SERPL-MCNC: 226 MG/DL (ref ?–150)
TRIGL SERPL-MCNC: 284 MG/DL (ref ?–150)
TRIGL SERPL-MCNC: 293 MG/DL (ref ?–150)
TROPONIN I SERPL-MCNC: 0.09 NG/ML (ref ?–0.1)
TROPONIN I SERPL-MCNC: 0.16 NG/ML (ref ?–0.1)
WBC # BLD AUTO: 11.7 10^3/UL (ref 4–10)
WBC # BLD AUTO: 12.2 10^3/UL (ref 4–10)

## 2021-01-06 PROCEDURE — 3E0333Z INTRODUCTION OF ANTI-INFLAMMATORY INTO PERIPHERAL VEIN, PERCUTANEOUS APPROACH: ICD-10-PCS | Performed by: INTERNAL MEDICINE

## 2021-01-06 PROCEDURE — XW033E5 INTRODUCTION OF REMDESIVIR ANTI-INFECTIVE INTO PERIPHERAL VEIN, PERCUTANEOUS APPROACH, NEW TECHNOLOGY GROUP 5: ICD-10-PCS | Performed by: GENERAL PRACTICE

## 2021-01-06 RX ADMIN — MULTIPLE VITAMINS W/ MINERALS TAB SCH TAB: TAB at 09:16

## 2021-01-06 RX ADMIN — ASPIRIN SCH MG: 81 TABLET ORAL at 09:16

## 2021-01-06 RX ADMIN — DEXTROSE MONOHYDRATE SCH MG: 50 INJECTION, SOLUTION INTRAVENOUS at 09:15

## 2021-01-06 RX ADMIN — LABETALOL HYDROCHLORIDE SCH MG: 100 TABLET, FILM COATED ORAL at 05:13

## 2021-01-06 RX ADMIN — SODIUM CHLORIDE SCH MLS/HR: 4.5 INJECTION, SOLUTION INTRAVENOUS at 17:00

## 2021-01-06 RX ADMIN — SODIUM CHLORIDE SCH MLS/HR: 4.5 INJECTION, SOLUTION INTRAVENOUS at 07:56

## 2021-01-06 RX ADMIN — INSULIN DETEMIR SCH UNITS: 100 INJECTION, SOLUTION SUBCUTANEOUS at 20:26

## 2021-01-06 RX ADMIN — DEXAMETHASONE SODIUM PHOSPHATE SCH MG: 4 INJECTION, SOLUTION INTRAMUSCULAR; INTRAVENOUS at 09:15

## 2021-01-06 RX ADMIN — ATORVASTATIN CALCIUM SCH MG: 20 TABLET, FILM COATED ORAL at 09:16

## 2021-01-06 RX ADMIN — INSULIN LISPRO SCH UNITS: 100 INJECTION, SOLUTION INTRAVENOUS; SUBCUTANEOUS at 12:05

## 2021-01-06 RX ADMIN — ENOXAPARIN SODIUM SCH MG: 40 INJECTION SUBCUTANEOUS at 12:05

## 2021-01-06 RX ADMIN — INSULIN LISPRO SCH UNITS: 100 INJECTION, SOLUTION INTRAVENOUS; SUBCUTANEOUS at 17:00

## 2021-01-06 RX ADMIN — Medication SCH: at 00:00

## 2021-01-06 RX ADMIN — CEFEPIME HYDROCHLORIDE SCH MLS/HR: 2 INJECTION, POWDER, FOR SOLUTION INTRAVENOUS at 04:31

## 2021-01-06 RX ADMIN — INSULIN LISPRO SCH UNITS: 100 INJECTION, SOLUTION INTRAVENOUS; SUBCUTANEOUS at 20:26

## 2021-01-06 NOTE — REP
INDICATION:

tachypnea



COMPARISON:

01/05/2021, 02/27/2018



TECHNIQUE:

Portable AP view of the chest



FINDINGS:

The mediastinum and cardiac silhouette are stable and within normal limits for

portable technique.  The lung fields demonstrate relatively stable chronic changes.

Subtle superimposed bibasilar atelectasis cannot be excluded.  No effusion.  No

pneumothorax.  Skeletal structures are intact.



IMPRESSION:

Relatively stable examination similar to multiple prior examinations.  Very subtle

superimposed atelectasis cannot be excluded.





<Electronically signed by Clyde Lockwood > 01/06/21 [id number]

## 2021-01-06 NOTE — IPNPDOC
Date Seen


The patient was seen on 1/6/21.





Progress Note


Called to assess patient due to AMS. -150, sinus on cardiac monitor. pO2 

94 on 3L, RR 50. Febrile 100.8. Patient is not verbalizing. CT head performed 

earlier showed on CVA.





Patient meets SIRS criteria. Started on vanc/cefepime.Blood and urine cultures 

are pending. Given significant tachypnea and tachycardia with elevated D-dimer> 

4000, will start empiric anticoagulation for suspected PE. 





Noted to have hypernatremia to 159. Stopped IV NS. Switched to D5W. Transitioned

to SC insulin, home regimen levemir 70 units sc daily, with ISS.





VS, I&O, 24H, Fishbone


Vital Signs/I&O





Vital Signs








  Date Time  Temp Pulse Resp B/P (MAP) Pulse Ox O2 Delivery O2 Flow Rate FiO2


 


1/6/21 02:00 104.4 134 44 127/67 (87) 93 Nasal Cannula 3.0 














I&O- Last 24 Hours up to 6 AM 


 


 1/6/21





 06:00


 


Intake Total 2158 ml


 


Output Total 1310 ml


 


Balance 848 ml











Laboratory Data


24H LABS


Laboratory Tests 2


1/5/21 12:50: 


Immature Granulocyte % (Auto) 1.1, Neutrophils (%) (Auto) 83.3H, Lymphocytes (%)

(Auto) 7.3L, Monocytes (%) (Auto) 7.8H, Eosinophils (%) (Auto) 0.0, Basophils 

(%) (Auto) 0.5, Neutrophils # (Auto) 10.2H, Lymphocytes # (Auto) 0.9L, Monocytes

# (Auto) 1.0H, Eosinophils # (Auto) 0.0, Basophils # (Auto) 0.1, Nucleated Red 

Blood Cells % (auto) 0.0, Blood Gas Bicarbonate Standard 13.6, Venous Blood pH 

7.269L, Venous Blood Partial Pressure CO2 22.4L, Venous Blood Partial Pressure 

O2 82.6H, Venous Blood Total Carbon Dioxide 10.7L, Venous Blood HCO3 10.0L, 

Venous Blood Oxygen Saturation 95.0H, Venous Blood Base Excess -14.5L, Anion Gap

19H, Glomerular Filtration Rate 31.4L, Estimated Mean Plasma Glucose 209H, 

Hemoglobin A1c 8.9, Osmolality 370H, Calcium Level 9.9, Total Bilirubin 0.9, 

Direct Bilirubin 0.5H, Aspartate Amino Transf (AST/SGOT) 77H, Alanine 

Aminotransferase (ALT/SGPT) 63, Alkaline Phosphatase 93, Total Creatine Kinase 

2919H, Creatine Kinase MB 24.1H, Creatine Kinase MB Relative Index 0.83, 

Troponin I 0.03, Total Protein 7.9, Albumin 3.1L, Albumin/Globulin Ratio 0.6, 

Lipase 39L, Thyroid Stimulating Hormone (TSH) 0.666, Free Thyroxine 1.45, Urine 

Opiates Screen NEGATIVE, Urine Methadone Screen NEGATIVE, Urine Barbiturates Sc

reen NEGATIVE, Urine Phencyclidine Screen NEGATIVE, Urine Amphetamines Screen 

NEGATIVE, Urine Benzodiazepines Screen NEGATIVE, Urine Cocaine Metabolite Screen

NEGATIVE, Urine Cannabinoids Screen NEGATIVE, Ethyl Alcohol Level 0.005, B-

Hydroxybutyrate > 46.00H


1/5/21 13:16: Bedside Glucose (Misc Panel) > 600*H


1/5/21 16:08: 


Coronavirus (COVID-19)(PCR) POSITIVEA, Influenza Type A (RT-PCR) NEGATIVE, 

Influenza Type B (RT-PCR) NEGATIVE, Respiratory Syncytial Virus (PCR) NEGATIVE


1/5/21 16:14: 


Estimated Mean Plasma Glucose 220H, Hemoglobin A1c 9.3, Troponin I 0.03


1/5/21 17:25: Bedside Glucose (Misc Panel) 512*H


1/5/21 17:57: Troponin I 0.04#


1/5/21 18:59: Bedside Glucose (Misc Panel) 469H


1/5/21 20:04: Bedside Glucose (Misc Panel) 402H


1/5/21 20:54: Bedside Glucose (Misc Panel) 315H


1/5/21 21:52: Bedside Glucose (Misc Panel) 253H


1/5/21 22:36: 


Anion Gap 8, Glomerular Filtration Rate 36.3, Osmolality 363H, Calcium Level 

8.7L, Phosphorus Level 1.0L, Magnesium Level 2.9H, Troponin I 0.07#


1/5/21 22:51: Bedside Glucose (Misc Panel) 221H


1/5/21 23:22: 


Blood Gas Bicarbonate Standard 20.8L, Arterial Blood pH 7.432, Arterial Blood 

Partial Pressure CO2 27.9L, Arterial Blood Partial Pressure O2 79.0, Arterial 

Blood Total CO2 19.0L, Arterial Blood HCO3 18.2L, Arterial Blood Base Excess -

4.5L, Arterial Blood Oxygen Saturation 96.4


1/6/21 00:01: Bedside Glucose (Misc Panel) 187H


1/6/21 01:00: 


Urine Color NEGIN, Urine Appearance TURBIDH, Urine pH 9.0, Urine Specific 

Gravity 1.017, Urine Protein 3+H, Urine Glucose (UA) 2+H, Urine Ketones TRACEH, 

Urine Blood 2+H, Urine Nitrite NEGATIVE, Urine Bilirubin NEGATIVE, Urine 

Urobilinogen 0.2, Urine Leukocyte Esterase 2+H, Urine WBC (Auto) 7H, Urine RBC 

(Auto) 1, Urine Hyaline Casts (Auto) 0, Urine Bacteria (Auto) 2+H, Urine 

Squamous Epithelial Cells 0, Urine Transitional Epithelial Cells 1, Urine Triple

Phosphate Cryst (Auto) SMALL, Urine Amorphous Sediment SMALLH, Urine Mucus 

(Auto) SMALL, Urine Sperm (Auto) 


1/6/21 01:11: Bedside Glucose (Misc Panel) 228H


1/6/21 01:35: 


Prothrombin Time 16.3H, Prothromb Time International Ratio 1.28, Activated 

Partial Thromboplast Time 28.5, Fibrinogen 830H, D-Dimer, Quantitative > 4000H, 

Anion Gap 3L, Glomerular Filtration Rate 34.4L, Calcium Level 8.9, Phosphorus 

Level 1.0L, NT-Pro-B-Type Natriuretic Peptide 392


CBC/BMP


Laboratory Tests


1/5/21 12:50








1/5/21 22:36








1/6/21 01:35








Microbiology





Microbiology


1/6/21 Urine Culture, Received


         Pending


1/5/21 Blood Culture, Received


         Pending


1/5/21 Blood Culture, Received


         Pending











TITO LEHMAN MD        Jan 6, 2021 02:55

## 2021-01-06 NOTE — IPNPDOC
Text Note


Date of Service


The patient was seen on 21.





NOTE


SUBJECTIVE:


-Had a RRT overnight for AMS and was noted to be in sinus tachycardia to -

150, pO2 94 on 3L, RR 50 and febrile to 100.8 --> was empirically started on 

vanc/cefepime and therapeutic dosing lovenox. 


-Also gap had closed and was given 70u of levemir at 1AM and fluids were 

switched to 1/2NS from D5W? and prior had been on NS and LR


-This morning denies chest pain, palpitations, remain on 3L 





PHYSICAL EXAMINATION:


VITAL SIGNS: See below


GENERAL APPEARANCE: Ill appearing, however without respiratory distress, nasal 

canula in place, with poor dentition 


HEENT: Dry mucous membranes. No JVD


CARDIOVASCULAR: Tachycardic but regular rhythm, S1, S2, no noted murmurs, rubs 

or gallops


LUNGS: Clear to auscultation. No wheezing, rales or rhonchi. Air entry is equal.




ABDOMEN: Normoactive bowel sounds, soft, nontender, nondistended. No rebound or 

guarding. Has urostomy bag


EXTREMITIES: No cyanosis, clubbing or pitting edema


NEURO: AOx3, moving all extremities, clear speech





LABORATORY DATA: 


WBC 12.2


Hgb 13.6


platelets 352


Na corrected is 154


K 5.1


Cr 1.95


[place] [address]





IMAGIN/5/21 CT HEAD





FINDINGS:


Age-related atrophy with periventricular leukomalacia and microvascular ischemic


changes are appreciated. The ventricles and sulci are symmetric. Gray-white


differentiation is maintained.  There is no evidence for acute intracranial


hemorrhage, mass/mass effect, pathology or infarction.  No extra-axial fluid


collection.  Calvarium is intact.  Paranasal sinuses and mastoid air cells are 

clear.


Incidental 3 cm sebaceous cyst along the posterior midline aspect of the 

calvarium.





IMPRESSION:


Age related atrophy and microvascular ischemic changes.


No acute intracranial hemorrhage, infarction, or mass/mass effect.








21 CXR:





FINDINGS:


There is no acute infiltrate or pulmonary edema.  The heart is normal in size.  

The


mediastinal silhouette is unchanged.  The visualized osseous structures are


unremarkable.





IMPRESSION:


NO ACUTE PULMONARY DISEASE.








MICROBIOLOGY: Please see below. 





ASSESSMENT: 


85-year-old M who lives alone in a one-story home and usually very active at 

baseline with past medical history significant for type 2 diabetes, bladder CA 

status post robotic radical cystoscopy prostatectomy 2018 with 

urostomy, chronic kidney disease stage III, hyperlipidemia, who was bought in 

after being found down and now admitted for DKA, renal failure due to 

rhabdomyolysis, Covid-19 infection with hypoxemic respiratory failure, sepsis 

likely 2/2 chronic urostomy associated UTI.





PLAN:





Acute diabetic ketoacidosis like 2/2 sepsis 2/2 UTI and covid-19 infection:


-s/p insulin gtt --> gap closed --> got 70u of levemir at 1AM will start SSI and

check Q2 FSBG and Q4H BMP for now


-high risk for DKA now that he will be receiving steroids for covid-19, may have

to restart insulin gtt, but will monitor for now


-clears diet for now with plan for consistent carb if glucose continues to 

downtrend and gap remains closed


-hydration with 1/2NS, pending nephrology recs





DM:


-plan per above with hypoglycemia protocol in place








LENNY on CKD 2/2 dehydration, sepsis and rhabdo


-fluids, currently on 1/2NS for hypernatremia, monitor CK and BMPs


-strict I/Os


-has urostomy


-nephrology consulted, who will e-consult and leave recs


-daily weights


-will attempt to get dry CT chest to assess reserve for how aggressive we can 

get with hydration given concurrent covid-19 infection with already ongoing 

hypoxemia





Acute rhabdomyolysis on chronic kidney disease stage III


-Patient will be given intravenous fluids normal saline. 


-Strict I/Os and daily weights. 


-Monitor for respiratory distress on fluids. 


-Avoid nephrotoxins. 


-Renally dose all medications. 





History of bladder tumor s/p TURBT robotic radical cystoscopy prostatectomy  with urostomy


-Urostomy care





Sepsis with UTI in the setting of chronic urostomy:


-f/u BCx


-Fresh UA on urostomy change this AM


-continue vanc/cefepime for now, MRSA PCR pending


-f/u UCx


-f/u dry CT chest


-pending procalcitonin





Covid-19 infection: 


-with D-dimer> 4000 with give 40mg BID lovenox


-dex 6mg daily given hypoxemia


-remdesivir as already started by Dr. Rios, today is day #1


-strict covid labs per protocol


-supplemental oxygen with goal >92%


-incentive spirometry


-droplet precautions








HLD:


-continue home statin at daily dosing





Grade 1 LV diastolic CHF, compensated


-appears euvolemic, even dry at the moment. 


-Strict i/os. 


-Daily weights





Deconditioning and debility status post fall at home. 


-ARU screen once clinically stable


-fall precautions. 


-Activity as tolerated.





DVT prophylaxis. Compression stockings and lovenox BID





Dispo: pending clinical improvement





VS,Fishbone, I+O


VS, Fishbone, I+O


Laboratory Tests


21 12:50








21 22:36








21 01:35








21 06:03











Vital Signs








  Date Time  Temp Pulse Resp B/P (MAP) Pulse Ox O2 Delivery O2 Flow Rate FiO2


 


21 09:16  92  137/63    


 


21 06:07 97.4  24  98 Nasal Cannula 3.0 














I&O- Last 24 Hours up to 6 AM 


 


 21





 05:59


 


Intake Total 3514 ml


 


Output Total 1370 ml


 


Balance 2144 ml

















LOLA GARCIA MD    2021 09:47

## 2021-01-06 NOTE — IPN
NEPHROLOGY PROGRESS NOTE



DATE:  01/06/2021



SUBJECTIVE:  Dr. Dotson called me today for renal recommendations for Mr. Jose Manuel Stanley. He is an 85-year-old male with a past medical history of bladder cancer

status post prostatectomy and urostomy, chronic kidney disease stage 3,

diabetes mellitus and dyslipidemia along with diastolic congestive heart

failure. He has a baseline creatinine of 1.1. The patient is presently admitted

to the [facility]ensive [facility] Unit with hypoxic respiratory failure with COVID-19

infection, severely elevated D-dimers, acute diabetic ketoacidosis, urinary

tract infection, sepsis, rhabdomyolysis, acute kidney injury, hypernatremia. He

presented with an initial anion gap of 20 (when corrected for albumin) and an

initial glucose of 611 with a corrected sodium of 155. He was concomitantly

hyperkalemic with potassium of 5.5 and had an acute kidney injury with

creatinine of 2.1. The patient was admitted and given a variety of fluids

including normal saline and lactated Ringer's and was started on an insulin

drip and also started on Vancomycin and Cefepime as he met SIRS criteria with

tachypnea, tachycardia and leukocytosis, high grade fevers, and he was on nasal

cannula for his oxygen requirements, 2-3 liters per minute. Nephrology

recommendations were requested in terms of his fluid management in view of his

hypernatremia, DKA and acute kidney injury. He also has a CK that has risen

from 2,000 to 10,000. 



MEDICATIONS: Present medications are reviewed and noted. He got a variety of

fluids since he came in including lactated Ringer's and normal saline and he is

presently on half normal saline at 100 mL an hour, also on Cefepime 2 grams IV

daily, was on insulin drip but presently has received Levemir 70 units

subcutaneously x1. He received potassium phosphate 30 millimole IV x1.

Vancomycin one gram IV daily. Amlodipine 10 mg p.o. daily. Aspirin 81 mg p.o.

daily. Lipitor 20 mg p.o. daily. Dexamethasone 6 mg IV daily, Lovenox 40 mg

subcutaneously daily. Lopressor 5 mg IV x1, Protonix 40 mg IV daily. 



LABORATORY STUDIES: Most recent laboratory studies show this morning at 6:00

a.m. glucose of 526, corrected sodium of 154 (initial sodium was 155),

potassium 5.1, BUN 46, creatinine 1.9, lactic 3.4, creatinine kinase 10,800,

.



NEPHROLOGY RECOMMENDATIONS:  



1.  Hypernatremia in this patient with concomitant recent diabetic ketoacidosis

    - The patient would benefit from hypotonic fluid. Ideally I would love to

    use d5w, however he recently was in DKA and his gap has just closed

    overnight. He is also receiving steroids for COVID-19, hypoxic respiratory

    failure and hence he is not suitable for any dextrose containing fluids. I

    recommend to continue him at present on half normal saline at 100 mL an

    hour. I have already spoken to Pharmacy about getting quarter normal saline

    for him instead and Pharmacy will formulate it, but the earliest it will be

    available is the morning of January 7th. I expect his serum sodium should

    improve somewhat with half normal saline, and we can continue that until

    quarter normal saline is available. 



2.  Rhabdomyolysis  his creatinine kinase is rising, but the patient is making

    adequate urine. Output today is already one liter, and I would continue with

    hypotonic fluids at this time. His BNP is less than 500. His chest x-ray did

    not show any sign of pulmonary edema or pulmonary vascular congestion.

    Continue hypotonic IV fluids at this time and get serial creatine kinase

    levels. His renal function is mildly improved since he came in.



3.  Acute kidney injury on chronic kidney disease stage 2  baseline creatinine

    is 1.1. The patient has a urostomy. He is making urine. He is status post

    DKA. He has acute rhabdo. Continue hypotonic fluids at this time. Would

    continue hypotonic fluids at this time and as he is receiving IV steroids,

    keep close watch to make sure that his gap does not open again (recent

    DKA).  



4.  Sepsis (COVID-19 infection, urinary tract infection)  blood cultures are no

    growth for 24 hours. Urine culture is pending. He is already on broad

    spectrum empiric antibiotics. His fever curve has lessened. He is

    hemodynamically stable blood pressure wise, although he is quite

    tachycardic. 



5.  The patient was not seen nor examined by myself. Chart and labs and

    medications were reviewed and I discussed recommendations in person with Dr. Dotson. I will follow peripherally. There is no billing for this E-visit.

## 2021-01-07 VITALS — SYSTOLIC BLOOD PRESSURE: 130 MMHG | DIASTOLIC BLOOD PRESSURE: 60 MMHG

## 2021-01-07 VITALS — DIASTOLIC BLOOD PRESSURE: 65 MMHG | SYSTOLIC BLOOD PRESSURE: 136 MMHG

## 2021-01-07 VITALS — DIASTOLIC BLOOD PRESSURE: 56 MMHG | SYSTOLIC BLOOD PRESSURE: 110 MMHG

## 2021-01-07 VITALS — SYSTOLIC BLOOD PRESSURE: 111 MMHG | DIASTOLIC BLOOD PRESSURE: 57 MMHG

## 2021-01-07 VITALS — SYSTOLIC BLOOD PRESSURE: 120 MMHG | DIASTOLIC BLOOD PRESSURE: 60 MMHG

## 2021-01-07 VITALS — DIASTOLIC BLOOD PRESSURE: 63 MMHG | SYSTOLIC BLOOD PRESSURE: 134 MMHG

## 2021-01-07 VITALS — DIASTOLIC BLOOD PRESSURE: 63 MMHG | SYSTOLIC BLOOD PRESSURE: 122 MMHG

## 2021-01-07 VITALS — SYSTOLIC BLOOD PRESSURE: 128 MMHG | DIASTOLIC BLOOD PRESSURE: 59 MMHG

## 2021-01-07 VITALS — SYSTOLIC BLOOD PRESSURE: 118 MMHG | DIASTOLIC BLOOD PRESSURE: 56 MMHG

## 2021-01-07 VITALS — SYSTOLIC BLOOD PRESSURE: 144 MMHG | DIASTOLIC BLOOD PRESSURE: 68 MMHG

## 2021-01-07 VITALS — SYSTOLIC BLOOD PRESSURE: 112 MMHG | DIASTOLIC BLOOD PRESSURE: 76 MMHG

## 2021-01-07 VITALS — SYSTOLIC BLOOD PRESSURE: 129 MMHG | DIASTOLIC BLOOD PRESSURE: 62 MMHG

## 2021-01-07 LAB
ALBUMIN SERPL BCG-MCNC: 2.2 GM/DL (ref 3.2–5.2)
ALT SERPL W P-5'-P-CCNC: 172 U/L (ref 12–78)
APTT BLD: 33.2 SECONDS (ref 24.2–38.5)
BASOPHILS # BLD AUTO: 0 10^3/UL (ref 0–0.2)
BASOPHILS NFR BLD AUTO: 0.2 % (ref 0–1)
BILIRUB CONJ SERPL-MCNC: 0.2 MG/DL (ref 0–0.2)
BILIRUB SERPL-MCNC: 0.5 MG/DL (ref 0.2–1)
BUN SERPL-MCNC: 49 MG/DL (ref 7–18)
CALCIUM SERPL-MCNC: 8.4 MG/DL (ref 8.8–10.2)
CHLORIDE SERPL-SCNC: 123 MEQ/L (ref 98–107)
CK SERPL-CCNC: 3634 U/L (ref 39–308)
CO2 SERPL-SCNC: 26 MEQ/L (ref 21–32)
CREAT SERPL-MCNC: 1.65 MG/DL (ref 0.7–1.3)
CRP SERPL-MCNC: 15.3 MG/DL (ref 0–0.3)
D DIMER PPP DDU-MCNC: 3656.87 NG/ML (ref ?–500)
EOSINOPHIL # BLD AUTO: 0 10^3/UL (ref 0–0.5)
EOSINOPHIL NFR BLD AUTO: 0 % (ref 0–3)
FERRITIN SERPL-MCNC: 1096 NG/ML (ref 26–388)
FIBRINOGEN PPP-MCNC: 740 MG/DL (ref 221–452)
GFR SERPL CREATININE-BSD FRML MDRD: 42.4 ML/MIN/{1.73_M2} (ref 35–?)
GLUCOSE SERPL-MCNC: 106 MG/DL (ref 70–100)
HCT VFR BLD AUTO: 41.2 % (ref 42–52)
HGB BLD-MCNC: 12.6 G/DL (ref 13.5–17.5)
INR PPP: 1.31
LYMPHOCYTES # BLD AUTO: 0.9 10^3/UL (ref 1.5–5)
LYMPHOCYTES NFR BLD AUTO: 7.3 % (ref 24–44)
MAGNESIUM SERPL-MCNC: 2.3 MG/DL (ref 1.8–2.4)
MCH RBC QN AUTO: 28.4 PG (ref 27–33)
MCHC RBC AUTO-ENTMCNC: 30.6 G/DL (ref 32–36.5)
MCV RBC AUTO: 92.8 FL (ref 80–96)
MONOCYTES # BLD AUTO: 0.5 10^3/UL (ref 0–0.8)
MONOCYTES NFR BLD AUTO: 3.9 % (ref 0–5)
NEUTROPHILS # BLD AUTO: 11.3 10^3/UL (ref 1.5–8.5)
NEUTROPHILS NFR BLD AUTO: 87.1 % (ref 36–66)
NT-PRO BNP: 367 PG/ML (ref ?–450)
PLATELET # BLD AUTO: 327 10^3/UL (ref 150–450)
POTASSIUM SERPL-SCNC: 4.8 MEQ/L (ref 3.5–5.1)
PROT SERPL-MCNC: 5.7 GM/DL (ref 6.4–8.2)
PROTHROMBIN TIME: 16.6 SECONDS (ref 12.5–14.3)
RBC # BLD AUTO: 4.44 10^6/UL (ref 4.3–6.1)
SODIUM SERPL-SCNC: 153 MEQ/L (ref 136–145)
WBC # BLD AUTO: 12.9 10^3/UL (ref 4–10)

## 2021-01-07 RX ADMIN — ASPIRIN SCH MG: 81 TABLET ORAL at 10:34

## 2021-01-07 RX ADMIN — SODIUM CHLORIDE SCH MLS/HR: 4.5 INJECTION, SOLUTION INTRAVENOUS at 01:21

## 2021-01-07 RX ADMIN — SODIUM CHLORIDE SCH ML: 9 INJECTION, SOLUTION INTRAMUSCULAR; INTRAVENOUS; SUBCUTANEOUS at 12:54

## 2021-01-07 RX ADMIN — INSULIN LISPRO SCH UNITS: 100 INJECTION, SOLUTION INTRAVENOUS; SUBCUTANEOUS at 17:31

## 2021-01-07 RX ADMIN — ATORVASTATIN CALCIUM SCH MG: 20 TABLET, FILM COATED ORAL at 10:34

## 2021-01-07 RX ADMIN — CEFEPIME HYDROCHLORIDE SCH MLS/HR: 2 INJECTION, POWDER, FOR SOLUTION INTRAVENOUS at 04:52

## 2021-01-07 RX ADMIN — MULTIPLE VITAMINS W/ MINERALS TAB SCH TAB: TAB at 10:34

## 2021-01-07 RX ADMIN — INSULIN DETEMIR SCH UNITS: 100 INJECTION, SOLUTION SUBCUTANEOUS at 23:14

## 2021-01-07 RX ADMIN — DEXAMETHASONE SODIUM PHOSPHATE SCH MG: 4 INJECTION, SOLUTION INTRAMUSCULAR; INTRAVENOUS at 10:35

## 2021-01-07 RX ADMIN — INSULIN LISPRO SCH UNITS: 100 INJECTION, SOLUTION INTRAVENOUS; SUBCUTANEOUS at 07:30

## 2021-01-07 RX ADMIN — INSULIN LISPRO SCH UNITS: 100 INJECTION, SOLUTION INTRAVENOUS; SUBCUTANEOUS at 12:55

## 2021-01-07 RX ADMIN — DEXTROSE MONOHYDRATE SCH MLS/HR: 50 INJECTION, SOLUTION INTRAVENOUS at 10:36

## 2021-01-07 RX ADMIN — DEXTROSE MONOHYDRATE SCH MG: 50 INJECTION, SOLUTION INTRAVENOUS at 10:35

## 2021-01-07 RX ADMIN — SODIUM CHLORIDE SCH MLS/HR: 9 INJECTION, SOLUTION INTRAVENOUS at 10:36

## 2021-01-07 RX ADMIN — INSULIN LISPRO SCH UNITS: 100 INJECTION, SOLUTION INTRAVENOUS; SUBCUTANEOUS at 23:11

## 2021-01-07 RX ADMIN — ENOXAPARIN SODIUM SCH MG: 40 INJECTION SUBCUTANEOUS at 12:56

## 2021-01-07 RX ADMIN — ENOXAPARIN SODIUM SCH MG: 40 INJECTION SUBCUTANEOUS at 01:07

## 2021-01-07 NOTE — IPN
NEPHROLOGY PROGRESS NOTE



DATE:  01/07/2021



SUBJECTIVE:  Chart reviewed and previous 24 hour events noted. The patient has

stable oxygen requirements and continues on 2 liters nasal cannula. He has been

afebrile the past 24 hours and he hemodynamically stable. His urine output in

the past 24 hours is 1,200 mL and he is positive 2.6 liters. His laboratory

studies show improvement in serum sodium, now down to 153 and improvement in

renal function with creatinine down from 2.1 to 1.6 today. His creatine kinase

has also decreased appreciably from a peak of 11,000 down to 3,600 today. His

gap remains closed. He has been on half normal saline at 100 mL an hour, and he

remains on broad-spectrum antibiotics and IV steroids and systemic

anticoagulation. There was no chest x-ray that was done today. 



NEPHROLOGY RECOMMENDATIONS:  The patient's acute kidney injury is improving.

His anion gap remains closed. His rhabdomyolysis shows improvement as well with

significant decrease in creatine kinase level. Given that his sugars are

controlled, at this point I suggest that we switch to d5w for further

correction of his hypernatremia. I am going to cut the rate of the fluids down

to 75 mL an hour because he is positive 2.6 liters in the past 24 hours,

although I do note his oxygen requirements remain stable with only 2 liters of

nasal cannula. I will further cut the fluids down later this evening, and I

suggest that the patient should have a daily chest x-ray given history of

diastolic congestive heart failure, present COVID infection, present need for

IV fluids given acute kidney injury with free water deficit. 



Recommendations were discussed on the phone this morning with Dr. Dotson.

## 2021-01-07 NOTE — IPNPDOC
Text Note


Date of Service


The patient was seen on 21.





NOTE


SUBJECTIVE:


-Doing much better this morning. Sitting up in chair. Working with PT, did well.


-This morning denies chest pain, palpitations, remain on 2L 





PHYSICAL EXAMINATION:


VITAL SIGNS: See below


GENERAL APPEARANCE: Ill appearing, however without respiratory distress, nasal 

canula in place, with poor dentition 


HEENT: Moist mucous membranes. No JVD


CARDIOVASCULAR: Tachycardic but regular rhythm, S1, S2, no noted murmurs, rubs 

or gallops


LUNGS: Clear to auscultation. No wheezing, rales or rhonchi. Air entry is equal.




ABDOMEN: Normoactive bowel sounds, soft, nontender, nondistended. No rebound or 

guarding. Has urostomy bag


EXTREMITIES: No cyanosis, clubbing or pitting edema


NEURO: AOx3, moving all extremities, clear speech





LABORATORY DATA: reviewed





IMAGIN/5/21 CT HEAD





FINDINGS:


Age-related atrophy with periventricular leukomalacia and microvascular ischemic


changes are appreciated. The ventricles and sulci are symmetric. Gray-white


differentiation is maintained.  There is no evidence for acute intracranial


hemorrhage, mass/mass effect, pathology or infarction.  No extra-axial fluid


collection.  Calvarium is intact.  Paranasal sinuses and mastoid air cells are 

clear.


Incidental 3 cm sebaceous cyst along the posterior midline aspect of the 

calvarium.





IMPRESSION:


Age related atrophy and microvascular ischemic changes.


No acute intracranial hemorrhage, infarction, or mass/mass effect.








21 CXR:





FINDINGS:


There is no acute infiltrate or pulmonary edema.  The heart is normal in size.  

The


mediastinal silhouette is unchanged.  The visualized osseous structures are


unremarkable.





IMPRESSION:


NO ACUTE PULMONARY DISEASE.








MICROBIOLOGY: Please see below. 





ASSESSMENT: 


85-year-old M who lives alone in a one-story home and usually very active at 

baseline with past medical history significant for type 2 diabetes, bladder CA 

status post robotic radical cystoscopy prostatectomy 2018 with 

urostomy, chronic kidney disease stage III, hyperlipidemia, who was bought in 

after being found down and now admitted for DKA, renal failure due to 

rhabdomyolysis, Covid-19 infection with hypoxemic respiratory failure, sepsis 

likely 2/2 chronic urostomy associated UTI.





PLAN:





Acute diabetic ketoacidosis like 2/2 sepsis 2/2 UTI and covid-19 infection: 

resolved


-s/p insulin gtt --> gap closed --> now on 80u QHS levemir


-high risk for DKA now that he will be receiving steroids for covid-19


-consistent carb diet 


-hydration with D5W per nephrology recs for hyponatremia





DM:


-plan per above with hypoglycemia protocol in place


-FSBG AC/HS


-SSI AC/HS


-levemir 80u levemir QHS








LENNY on CKD 2/2 dehydration, sepsis and rhabdo


-fluids, currently on D5W for hypernatremia, monitor CK (downtrending) and daily

BMPs


-strict I/Os


-has urostomy


-nephrology e-consulted, appreciate recs


-daily weights





Acute rhabdomyolysis on chronic kidney disease stage III: improving with 

hydration


-Strict I/Os and daily weights. 


-Monitor for respiratory distress on fluids. 


-Avoid nephrotoxins. 


-Renally dose all medications


-continue hydration per nephrology recs


 





History of bladder tumor s/p TURBT robotic radical cystoscopy prostatectomy 

2018 with urostomy


-Urostomy care





Sepsis with UTI in the setting of chronic urostomy:


-f/u BCx


-Fresh UA on urostomy change this AM


-continue cefepime for now, DC vanc, MRSA negative. Day #2 cefepime


-f/u UCx


-elevated procalcitonin so there is possible bacterial PNA as well that should 

be covered by cefepime





Covid-19 infection: 


-with D-dimer> 4000 with give 40mg BID lovenox


-dex 6mg daily given hypoxemia, day 2


-remdesivir as already started by Dr. Rios, today is day #2


-strict covid labs per protocol


-supplemental oxygen with goal >92%


-incentive spirometry


-droplet precautions








HLD:


-continue home statin at daily dosing





Grade 1 LV diastolic CHF, compensated


-euvolemic on exam


-Strict i/os. 


-Daily weights





Deconditioning and debility status post fall at home. 


-ARU screen once clinically stable


-fall precautions. 


-Activity as tolerated.





DVT prophylaxis. Compression stockings and lovenox BID





Dispo: Medsurg, 4 main





VS,Fishbone, I+O


VS, Fishbone, I+O


Laboratory Tests


21 13:07








21 16:14








21 21:12











Vital Signs








  Date Time  Temp Pulse Resp B/P (MAP) Pulse Ox O2 Delivery O2 Flow Rate FiO2


 


21 06:00  80 26 118/56 (76) 95 Nasal Cannula 2.0 


 


21 04:00 98.7       














I&O- Last 24 Hours up to 6 AM 


 


 21





 06:00


 


Intake Total 3660 ml


 


Output Total 1775 ml


 


Balance 1885 ml

















LOLA GARCIA MD    2021 08:02

## 2021-01-08 VITALS — DIASTOLIC BLOOD PRESSURE: 63 MMHG | SYSTOLIC BLOOD PRESSURE: 135 MMHG

## 2021-01-08 VITALS — SYSTOLIC BLOOD PRESSURE: 126 MMHG | DIASTOLIC BLOOD PRESSURE: 86 MMHG

## 2021-01-08 LAB
ALBUMIN SERPL BCG-MCNC: 2 GM/DL (ref 3.2–5.2)
ALT SERPL W P-5'-P-CCNC: 162 U/L (ref 12–78)
APTT BLD: 34.9 SECONDS (ref 24.2–38.5)
BASOPHILS # BLD AUTO: 0 10^3/UL (ref 0–0.2)
BASOPHILS NFR BLD AUTO: 0.2 % (ref 0–1)
BILIRUB CONJ SERPL-MCNC: 0.2 MG/DL (ref 0–0.2)
BILIRUB SERPL-MCNC: 0.4 MG/DL (ref 0.2–1)
BUN SERPL-MCNC: 49 MG/DL (ref 7–18)
CALCIUM SERPL-MCNC: 7.9 MG/DL (ref 8.8–10.2)
CHLORIDE SERPL-SCNC: 114 MEQ/L (ref 98–107)
CK SERPL-CCNC: 1180 U/L (ref 39–308)
CO2 SERPL-SCNC: 23 MEQ/L (ref 21–32)
CREAT SERPL-MCNC: 1.5 MG/DL (ref 0.7–1.3)
CRP SERPL-MCNC: 7.23 MG/DL (ref 0–0.3)
D DIMER PPP DDU-MCNC: 2762.07 NG/ML (ref ?–500)
EOSINOPHIL # BLD AUTO: 0 10^3/UL (ref 0–0.5)
EOSINOPHIL NFR BLD AUTO: 0 % (ref 0–3)
FERRITIN SERPL-MCNC: 936 NG/ML (ref 26–388)
FIBRINOGEN PPP-MCNC: 579 MG/DL (ref 221–452)
GFR SERPL CREATININE-BSD FRML MDRD: 47.4 ML/MIN/{1.73_M2} (ref 35–?)
GLUCOSE SERPL-MCNC: 263 MG/DL (ref 70–100)
HCT VFR BLD AUTO: 37.8 % (ref 42–52)
HGB BLD-MCNC: 11.9 G/DL (ref 13.5–17.5)
INR PPP: 1.25
LYMPHOCYTES # BLD AUTO: 0.8 10^3/UL (ref 1.5–5)
LYMPHOCYTES NFR BLD AUTO: 7.8 % (ref 24–44)
MAGNESIUM SERPL-MCNC: 2.3 MG/DL (ref 1.8–2.4)
MCH RBC QN AUTO: 29 PG (ref 27–33)
MCHC RBC AUTO-ENTMCNC: 31.5 G/DL (ref 32–36.5)
MCV RBC AUTO: 92 FL (ref 80–96)
MONOCYTES # BLD AUTO: 0.4 10^3/UL (ref 0–0.8)
MONOCYTES NFR BLD AUTO: 3.9 % (ref 0–5)
NEUTROPHILS # BLD AUTO: 8.7 10^3/UL (ref 1.5–8.5)
NEUTROPHILS NFR BLD AUTO: 86.3 % (ref 36–66)
NT-PRO BNP: 446 PG/ML (ref ?–450)
PLATELET # BLD AUTO: 298 10^3/UL (ref 150–450)
POTASSIUM SERPL-SCNC: 5.1 MEQ/L (ref 3.5–5.1)
PROT SERPL-MCNC: 5.2 GM/DL (ref 6.4–8.2)
PROTHROMBIN TIME: 16 SECONDS (ref 12.5–14.3)
RBC # BLD AUTO: 4.11 10^6/UL (ref 4.3–6.1)
SODIUM SERPL-SCNC: 142 MEQ/L (ref 136–145)
WBC # BLD AUTO: 10 10^3/UL (ref 4–10)

## 2021-01-08 RX ADMIN — INSULIN DETEMIR SCH UNITS: 100 INJECTION, SOLUTION SUBCUTANEOUS at 22:59

## 2021-01-08 RX ADMIN — MULTIPLE VITAMINS W/ MINERALS TAB SCH TAB: TAB at 08:56

## 2021-01-08 RX ADMIN — SODIUM CHLORIDE SCH ML: 9 INJECTION, SOLUTION INTRAMUSCULAR; INTRAVENOUS; SUBCUTANEOUS at 10:39

## 2021-01-08 RX ADMIN — ASPIRIN SCH MG: 81 TABLET ORAL at 08:56

## 2021-01-08 RX ADMIN — INSULIN LISPRO SCH UNITS: 100 INJECTION, SOLUTION INTRAVENOUS; SUBCUTANEOUS at 07:30

## 2021-01-08 RX ADMIN — INSULIN LISPRO SCH UNITS: 100 INJECTION, SOLUTION INTRAVENOUS; SUBCUTANEOUS at 21:00

## 2021-01-08 RX ADMIN — INSULIN LISPRO SCH UNITS: 100 INJECTION, SOLUTION INTRAVENOUS; SUBCUTANEOUS at 13:10

## 2021-01-08 RX ADMIN — CEFEPIME HYDROCHLORIDE SCH MLS/HR: 2 INJECTION, POWDER, FOR SOLUTION INTRAVENOUS at 04:51

## 2021-01-08 RX ADMIN — ENOXAPARIN SODIUM SCH MG: 40 INJECTION SUBCUTANEOUS at 13:10

## 2021-01-08 RX ADMIN — ENOXAPARIN SODIUM SCH MG: 40 INJECTION SUBCUTANEOUS at 01:48

## 2021-01-08 RX ADMIN — DEXTROSE MONOHYDRATE SCH MLS/HR: 50 INJECTION, SOLUTION INTRAVENOUS at 01:48

## 2021-01-08 RX ADMIN — SODIUM CHLORIDE SCH MLS/HR: 9 INJECTION, SOLUTION INTRAVENOUS at 10:39

## 2021-01-08 RX ADMIN — ATORVASTATIN CALCIUM SCH MG: 20 TABLET, FILM COATED ORAL at 08:56

## 2021-01-08 RX ADMIN — INSULIN LISPRO SCH UNITS: 100 INJECTION, SOLUTION INTRAVENOUS; SUBCUTANEOUS at 17:35

## 2021-01-08 RX ADMIN — DEXTROSE MONOHYDRATE SCH MG: 50 INJECTION, SOLUTION INTRAVENOUS at 08:55

## 2021-01-08 RX ADMIN — DEXAMETHASONE SODIUM PHOSPHATE SCH MG: 4 INJECTION, SOLUTION INTRAMUSCULAR; INTRAVENOUS at 08:55

## 2021-01-08 RX ADMIN — CIPROFLOXACIN SCH MG: 250 TABLET, FILM COATED ORAL at 17:36

## 2021-01-08 NOTE — IPNPDOC
Text Note


Date of Service


The patient was seen on 21.





NOTE


SUBJECTIVE:


-Continues to feel better. However now on 4L NC from 2L yesterday.


-Fluids were stopped by nephrology with resolution of hyperNa and LENNY


-This morning denies chest pain, palpitations





PHYSICAL EXAMINATION:


VITAL SIGNS: See below


GENERAL APPEARANCE: Ill appearing, however without respiratory distress, nasal 

canula in place, with poor dentition 


HEENT: Moist mucous membranes. No JVD


CARDIOVASCULAR: Tachycardic but regular rhythm, S1, S2, no noted murmurs, rubs 

or gallops


LUNGS: Clear to auscultation. No wheezing, no rales or rhonchi. Air entry is 

equal. 


ABDOMEN: Normoactive bowel sounds, soft, nontender, nondistended. No rebound or 

guarding. Has urostomy bag


EXTREMITIES: No cyanosis, clubbing or pitting edema


NEURO: AOx3, moving all extremities, clear speech





LABORATORY DATA: reviewed


Na 142


K 5.1


Cr 1.5


glucose 263


WBC 10


Hgb 11.9


platelets 298


Ddimer 2762





IMAGIN/5/21 CT HEAD





FINDINGS:


Age-related atrophy with periventricular leukomalacia and microvascular ischemic


changes are appreciated. The ventricles and sulci are symmetric. Gray-white


differentiation is maintained.  There is no evidence for acute intracranial


hemorrhage, mass/mass effect, pathology or infarction.  No extra-axial fluid


collection.  Calvarium is intact.  Paranasal sinuses and mastoid air cells are 

clear.


Incidental 3 cm sebaceous cyst along the posterior midline aspect of the 

calvarium.





IMPRESSION:


Age related atrophy and microvascular ischemic changes.


No acute intracranial hemorrhage, infarction, or mass/mass effect.








21 CXR:





FINDINGS:


There is no acute infiltrate or pulmonary edema.  The heart is normal in size.  

The


mediastinal silhouette is unchanged.  The visualized osseous structures are


unremarkable.





IMPRESSION:


NO ACUTE PULMONARY DISEASE.








MICROBIOLOGY: Please see below. 





ASSESSMENT: 


85-year-old M who lives alone in a one-story home and usually very active at 

baseline with past medical history significant for type 2 diabetes, bladder CA 

status post robotic radical cystoscopy prostatectomy 2018 with 

urostomy, chronic kidney disease stage III, hyperlipidemia, who was bought in 

after being found down and now admitted for DKA, renal failure due to 

rhabdomyolysis, Covid-19 infection with hypoxemic respiratory failure, sepsis 

likely 2/2 chronic urostomy associated UTI.





PLAN:





Acute diabetic ketoacidosis like 2/2 sepsis 2/2 UTI and covid-19 infection: 

resolved


-s/p insulin gtt --> gap closed --> now on 80u QHS levemir


-high risk for DKA now that he will be receiving steroids for covid-19


-consistent carb diet 


-FSBG AC/HS, SSI AC/HS, hypoglycemia protocol





DM:


-plan per above 





LENNY on CKD 2/2 dehydration, sepsis and rhabdo: resolved, back to recent baseline


-s/p fluids


-strict I/Os


-has urostomy


-nephrology e-consulted, appreciate recs. Signed off today


-daily weights





Acute rhabdomyolysis on chronic kidney disease stage III: improving with 

hydration


-Strict I/Os and daily weights. 


-Monitor for respiratory distress on fluids. 


-Avoid nephrotoxins. 


-Renally dose all medications





History of bladder tumor s/p TURBT robotic radical cystoscopy prostatectomy 

2018 with urostomy


-Urostomy care





Sepsis with UTI in the setting of chronic urostomy:


-f/u BCx


-Fresh UA on urostomy change this AM


-Discontinue cefepime , day 3 of abx, will switch to PO cipro for providencia 

UTI


-elevated procalcitonin so there is possible bacterial PNA as well that should 

be covered by abx above





Covid-19 infection: 


-with initial D-dimer> 4000 with give 40mg BID lovenox


-dex 6mg daily given hypoxemia, day 3


-remdesivir as already started by Dr. Rios, today is day #3


-strict covid labs per protocol


-supplemental oxygen with goal >92%


-incentive spirometry


-droplet precautions





HLD:


-continue home statin at daily dosing





Grade 1 LV diastolic CHF, compensated


-euvolemic on exam


-Strict i/os. 


-Daily weights





Deconditioning and debility status post fall at home. 


-ARU screen once clinically stable


-fall precautions. 


-Activity as tolerated.





DVT prophylaxis. Compression stockings and lovenox BID





Dispo: Medsurg, 4 main





VS,Fishbone, I+O


VS, Fishbone, I+O


Laboratory Tests


21 06:56











Vital Signs








  Date Time  Temp Pulse Resp B/P (MAP) Pulse Ox O2 Delivery O2 Flow Rate FiO2


 


21 08:56  62  130/78    


 


21 05:00 96.1  22  95 Nasal Cannula 4.0 














I&O- Last 24 Hours up to 6 AM 


 


 21





 06:00


 


Intake Total 2780 ml


 


Output Total 1610 ml


 


Balance 1170 ml

















LOLA GARCIA MD    2021 10:06

## 2021-01-09 VITALS — SYSTOLIC BLOOD PRESSURE: 127 MMHG | DIASTOLIC BLOOD PRESSURE: 62 MMHG

## 2021-01-09 VITALS — DIASTOLIC BLOOD PRESSURE: 65 MMHG | SYSTOLIC BLOOD PRESSURE: 122 MMHG

## 2021-01-09 VITALS — DIASTOLIC BLOOD PRESSURE: 67 MMHG | SYSTOLIC BLOOD PRESSURE: 127 MMHG

## 2021-01-09 LAB
ALBUMIN SERPL BCG-MCNC: 2.1 GM/DL (ref 3.2–5.2)
ALT SERPL W P-5'-P-CCNC: 187 U/L (ref 12–78)
APTT BLD: 32.7 SECONDS (ref 24.2–38.5)
BASOPHILS # BLD AUTO: 0 10^3/UL (ref 0–0.2)
BASOPHILS NFR BLD AUTO: 0.4 % (ref 0–1)
BILIRUB CONJ SERPL-MCNC: 0.2 MG/DL (ref 0–0.2)
BILIRUB SERPL-MCNC: 0.5 MG/DL (ref 0.2–1)
BUN SERPL-MCNC: 52 MG/DL (ref 7–18)
CALCIUM SERPL-MCNC: 8.1 MG/DL (ref 8.8–10.2)
CHLORIDE SERPL-SCNC: 115 MEQ/L (ref 98–107)
CK SERPL-CCNC: 495 U/L (ref 39–308)
CO2 SERPL-SCNC: 26 MEQ/L (ref 21–32)
CREAT SERPL-MCNC: 1.36 MG/DL (ref 0.7–1.3)
CRP SERPL-MCNC: 3.78 MG/DL (ref 0–0.3)
D DIMER PPP DDU-MCNC: 2523.82 NG/ML (ref ?–500)
EOSINOPHIL # BLD AUTO: 0 10^3/UL (ref 0–0.5)
EOSINOPHIL NFR BLD AUTO: 0 % (ref 0–3)
FERRITIN SERPL-MCNC: 771 NG/ML (ref 26–388)
FIBRINOGEN PPP-MCNC: 457 MG/DL (ref 221–452)
GFR SERPL CREATININE-BSD FRML MDRD: 53 ML/MIN/{1.73_M2} (ref 35–?)
GLUCOSE SERPL-MCNC: 90 MG/DL (ref 70–100)
HCT VFR BLD AUTO: 40.3 % (ref 42–52)
HGB BLD-MCNC: 12.6 G/DL (ref 13.5–17.5)
INR PPP: 1.25
LYMPHOCYTES # BLD AUTO: 0.7 10^3/UL (ref 1.5–5)
LYMPHOCYTES NFR BLD AUTO: 6.9 % (ref 24–44)
MAGNESIUM SERPL-MCNC: 2.2 MG/DL (ref 1.8–2.4)
MCH RBC QN AUTO: 28.4 PG (ref 27–33)
MCHC RBC AUTO-ENTMCNC: 31.3 G/DL (ref 32–36.5)
MCV RBC AUTO: 91 FL (ref 80–96)
MONOCYTES # BLD AUTO: 0.6 10^3/UL (ref 0–0.8)
MONOCYTES NFR BLD AUTO: 6 % (ref 0–5)
NEUTROPHILS # BLD AUTO: 8.9 10^3/UL (ref 1.5–8.5)
NEUTROPHILS NFR BLD AUTO: 84.9 % (ref 36–66)
NT-PRO BNP: 457 PG/ML (ref ?–450)
PLATELET # BLD AUTO: 330 10^3/UL (ref 150–450)
POTASSIUM SERPL-SCNC: 4.5 MEQ/L (ref 3.5–5.1)
PROT SERPL-MCNC: 5.4 GM/DL (ref 6.4–8.2)
PROTHROMBIN TIME: 16 SECONDS (ref 12.5–14.3)
RBC # BLD AUTO: 4.43 10^6/UL (ref 4.3–6.1)
SODIUM SERPL-SCNC: 145 MEQ/L (ref 136–145)
WBC # BLD AUTO: 10.5 10^3/UL (ref 4–10)

## 2021-01-09 RX ADMIN — ATORVASTATIN CALCIUM SCH MG: 20 TABLET, FILM COATED ORAL at 09:53

## 2021-01-09 RX ADMIN — DEXTROSE MONOHYDRATE SCH MG: 50 INJECTION, SOLUTION INTRAVENOUS at 09:53

## 2021-01-09 RX ADMIN — CIPROFLOXACIN SCH MG: 250 TABLET, FILM COATED ORAL at 17:48

## 2021-01-09 RX ADMIN — INSULIN LISPRO SCH UNITS: 100 INJECTION, SOLUTION INTRAVENOUS; SUBCUTANEOUS at 11:25

## 2021-01-09 RX ADMIN — INSULIN LISPRO SCH UNITS: 100 INJECTION, SOLUTION INTRAVENOUS; SUBCUTANEOUS at 21:00

## 2021-01-09 RX ADMIN — INSULIN LISPRO SCH UNITS: 100 INJECTION, SOLUTION INTRAVENOUS; SUBCUTANEOUS at 16:11

## 2021-01-09 RX ADMIN — CIPROFLOXACIN SCH MG: 250 TABLET, FILM COATED ORAL at 06:37

## 2021-01-09 RX ADMIN — ASPIRIN SCH MG: 81 TABLET ORAL at 09:48

## 2021-01-09 RX ADMIN — MULTIPLE VITAMINS W/ MINERALS TAB SCH TAB: TAB at 09:53

## 2021-01-09 RX ADMIN — INSULIN DETEMIR SCH UNITS: 100 INJECTION, SOLUTION SUBCUTANEOUS at 21:00

## 2021-01-09 RX ADMIN — INSULIN LISPRO SCH UNITS: 100 INJECTION, SOLUTION INTRAVENOUS; SUBCUTANEOUS at 07:30

## 2021-01-09 RX ADMIN — SODIUM CHLORIDE SCH ML: 9 INJECTION, SOLUTION INTRAMUSCULAR; INTRAVENOUS; SUBCUTANEOUS at 11:20

## 2021-01-09 RX ADMIN — SODIUM CHLORIDE SCH MLS/HR: 9 INJECTION, SOLUTION INTRAVENOUS at 09:53

## 2021-01-09 RX ADMIN — ENOXAPARIN SODIUM SCH MG: 40 INJECTION SUBCUTANEOUS at 12:34

## 2021-01-09 RX ADMIN — ENOXAPARIN SODIUM SCH MG: 40 INJECTION SUBCUTANEOUS at 02:05

## 2021-01-09 NOTE — IPNPDOC
Text Note


Date of Service


The patient was seen on 21.





NOTE


SUBJECTIVE:


-Continues to feel better than when he presented but feels significantly weaker 

than his baseline. However now on 4L NC


-This morning denies chest pain, palpitations





PHYSICAL EXAMINATION:


VITAL SIGNS: See below


GENERAL APPEARANCE: Ill appearing, however without respiratory distress, nasal 

canula in place, with poor dentition 


HEENT: Moist mucous membranes. No JVD


CARDIOVASCULAR: Tachycardic but regular rhythm, S1, S2, no noted murmurs, rubs 

or gallops


LUNGS: Clear to auscultation. No wheezing, no rales or rhonchi. Air entry is 

equal. 


ABDOMEN: Normoactive bowel sounds, soft, nontender, nondistended. No rebound or 

guarding. Has urostomy bag


EXTREMITIES: No cyanosis, clubbing or pitting edema


NEURO: AOx3, moving all extremities, clear speech





LABORATORY DATA: reviewed





IMAGIN/5/21 CT HEAD





FINDINGS:


Age-related atrophy with periventricular leukomalacia and microvascular ischemic


changes are appreciated. The ventricles and sulci are symmetric. Gray-white


differentiation is maintained.  There is no evidence for acute intracranial


hemorrhage, mass/mass effect, pathology or infarction.  No extra-axial fluid


collection.  Calvarium is intact.  Paranasal sinuses and mastoid air cells are 

clear.


Incidental 3 cm sebaceous cyst along the posterior midline aspect of the 

calvarium.





IMPRESSION:


Age related atrophy and microvascular ischemic changes.


No acute intracranial hemorrhage, infarction, or mass/mass effect.








21 CXR:





FINDINGS:


There is no acute infiltrate or pulmonary edema.  The heart is normal in size.  

The


mediastinal silhouette is unchanged.  The visualized osseous structures are


unremarkable.





IMPRESSION:


NO ACUTE PULMONARY DISEASE.








MICROBIOLOGY: Please see below. 





ASSESSMENT: 


85-year-old M who lives alone in a one-story home and usually very active at 

baseline with past medical history significant for type 2 diabetes, bladder CA 

status post robotic radical cystoscopy prostatectomy 2018 with 

urostomy, chronic kidney disease stage III, hyperlipidemia, who was bought in 

after being found down and now admitted for DKA, renal failure due to 

rhabdomyolysis, Covid-19 infection with hypoxemic respiratory failure, sepsis 

likely 2/2 chronic urostomy associated UTI.





PLAN:





Acute diabetic ketoacidosis like 2/2 sepsis 2/2 UTI and covid-19 infection: 

resolved


-s/p insulin gtt --> gap closed --> now on 80u QHS levemir


-high risk for DKA now that he will be receiving steroids for covid-19


-consistent carb diet 


-FSBG AC/HS, SSI AC/HS, hypoglycemia protocol





DM:


-plan per above 





LENNY on CKD 2/2 dehydration, sepsis and rhabdo: resolved, back to recent baseline


-s/p fluids


-strict I/Os


-has urostomy


-nephrology e-consulted, appreciate recs. Signed off today


-daily weights





Acute rhabdomyolysis on chronic kidney disease stage III: continues to improve 


-Strict I/Os and daily weights. 


-s/p fluids 


-Avoid nephrotoxins. 


-Renally dose all medications





History of bladder tumor s/p TURBT robotic radical cystoscopy prostatectomy 

2018 with urostomy


-Urostomy care





Sepsis with UTI in the setting of chronic urostomy:


-f/u BCx


-Fresh UA on urostomy change this AM


-PO cipro for providencia UTI, day 4


-elevated procalcitonin so there is possible bacterial PNA as well that should 

be covered by abx above





Covid-19 infection: 


-with initial D-dimer> 4000 with give 40mg BID lovenox


-dex 6mg daily given hypoxemia, day 3


-remdesivir as already started by Dr. Rios, today is day #3


-strict covid labs per protocol


-supplemental oxygen with goal >92%


-incentive spirometry


-droplet precautions





HLD:


-continue home statin at daily dosing





Grade 1 LV diastolic CHF, compensated


-euvolemic on exam


-Strict i/os. 


-Daily weights


-f/u pro-BNP





Deconditioning and debility status post fall at home. 


-ARU screen once clinically stable


-fall precautions. 


-Activity as tolerated.


-cont PT/OT





DVT prophylaxis. Compression stockings and lovenox BID





Dispo: Medsurg, 4 main





VS,Fishbone, I+O


VS, Fishbone, I+O





Vital Signs








  Date Time  Temp Pulse Resp B/P (MAP) Pulse Ox O2 Delivery O2 Flow Rate FiO2


 


21 06:37 97.1 89 20 127/62 (83) 92 Nasal Cannula 4.0 














I&O- Last 24 Hours up to 6 AM0 


 


 21





 06:00


 


Intake Total 2100 ml


 


Output Total 1150 ml


 


Balance 950 ml

















[email or web address]-DEMIAN,LOLA V. MD    2021 08:57

## 2021-01-10 VITALS — SYSTOLIC BLOOD PRESSURE: 120 MMHG | DIASTOLIC BLOOD PRESSURE: 70 MMHG

## 2021-01-10 VITALS — DIASTOLIC BLOOD PRESSURE: 63 MMHG | SYSTOLIC BLOOD PRESSURE: 121 MMHG

## 2021-01-10 VITALS — DIASTOLIC BLOOD PRESSURE: 58 MMHG | SYSTOLIC BLOOD PRESSURE: 108 MMHG

## 2021-01-10 VITALS — SYSTOLIC BLOOD PRESSURE: 121 MMHG | DIASTOLIC BLOOD PRESSURE: 56 MMHG

## 2021-01-10 VITALS — SYSTOLIC BLOOD PRESSURE: 115 MMHG | DIASTOLIC BLOOD PRESSURE: 68 MMHG

## 2021-01-10 LAB
ALBUMIN SERPL BCG-MCNC: 2 GM/DL (ref 3.2–5.2)
ALT SERPL W P-5'-P-CCNC: 240 U/L (ref 12–78)
APTT BLD: 34.7 SECONDS (ref 24.2–38.5)
BASOPHILS # BLD AUTO: 0 10^3/UL (ref 0–0.2)
BASOPHILS NFR BLD AUTO: 0.4 % (ref 0–1)
BILIRUB CONJ SERPL-MCNC: 0.2 MG/DL (ref 0–0.2)
BILIRUB SERPL-MCNC: 0.6 MG/DL (ref 0.2–1)
BUN SERPL-MCNC: 49 MG/DL (ref 7–18)
CALCIUM SERPL-MCNC: 8.2 MG/DL (ref 8.8–10.2)
CHLORIDE SERPL-SCNC: 112 MEQ/L (ref 98–107)
CK SERPL-CCNC: 316 U/L (ref 39–308)
CO2 SERPL-SCNC: 26 MEQ/L (ref 21–32)
CREAT SERPL-MCNC: 1.45 MG/DL (ref 0.7–1.3)
CRP SERPL-MCNC: 3.65 MG/DL (ref 0–0.3)
D DIMER PPP DDU-MCNC: 1932.28 NG/ML (ref ?–500)
EOSINOPHIL # BLD AUTO: 0 10^3/UL (ref 0–0.5)
EOSINOPHIL NFR BLD AUTO: 0.1 % (ref 0–3)
FERRITIN SERPL-MCNC: 620 NG/ML (ref 26–388)
FIBRINOGEN PPP-MCNC: 522 MG/DL (ref 221–452)
GFR SERPL CREATININE-BSD FRML MDRD: 49.2 ML/MIN/{1.73_M2} (ref 35–?)
GLUCOSE SERPL-MCNC: 53 MG/DL (ref 70–100)
HCT VFR BLD AUTO: 40.2 % (ref 42–52)
HGB BLD-MCNC: 12.4 G/DL (ref 13.5–17.5)
INR PPP: 1.21
LYMPHOCYTES # BLD AUTO: 1.2 10^3/UL (ref 1.5–5)
LYMPHOCYTES NFR BLD AUTO: 11.6 % (ref 24–44)
MAGNESIUM SERPL-MCNC: 2 MG/DL (ref 1.8–2.4)
MCH RBC QN AUTO: 28.2 PG (ref 27–33)
MCHC RBC AUTO-ENTMCNC: 30.8 G/DL (ref 32–36.5)
MCV RBC AUTO: 91.4 FL (ref 80–96)
MONOCYTES # BLD AUTO: 0.9 10^3/UL (ref 0–0.8)
MONOCYTES NFR BLD AUTO: 8.4 % (ref 0–5)
NEUTROPHILS # BLD AUTO: 8.1 10^3/UL (ref 1.5–8.5)
NEUTROPHILS NFR BLD AUTO: 77.4 % (ref 36–66)
NT-PRO BNP: 286 PG/ML (ref ?–450)
PLATELET # BLD AUTO: 362 10^3/UL (ref 150–450)
POTASSIUM SERPL-SCNC: 4 MEQ/L (ref 3.5–5.1)
PROT SERPL-MCNC: 5.3 GM/DL (ref 6.4–8.2)
PROTHROMBIN TIME: 15.6 SECONDS (ref 12.5–14.3)
RBC # BLD AUTO: 4.4 10^6/UL (ref 4.3–6.1)
SODIUM SERPL-SCNC: 143 MEQ/L (ref 136–145)
WBC # BLD AUTO: 10.4 10^3/UL (ref 4–10)

## 2021-01-10 RX ADMIN — SODIUM CHLORIDE SCH ML: 9 INJECTION, SOLUTION INTRAMUSCULAR; INTRAVENOUS; SUBCUTANEOUS at 11:11

## 2021-01-10 RX ADMIN — ENOXAPARIN SODIUM SCH MG: 40 INJECTION SUBCUTANEOUS at 13:33

## 2021-01-10 RX ADMIN — INSULIN LISPRO SCH UNITS: 100 INJECTION, SOLUTION INTRAVENOUS; SUBCUTANEOUS at 07:30

## 2021-01-10 RX ADMIN — INSULIN LISPRO SCH UNITS: 100 INJECTION, SOLUTION INTRAVENOUS; SUBCUTANEOUS at 13:34

## 2021-01-10 RX ADMIN — MULTIPLE VITAMINS W/ MINERALS TAB SCH TAB: TAB at 09:29

## 2021-01-10 RX ADMIN — INSULIN LISPRO SCH UNITS: 100 INJECTION, SOLUTION INTRAVENOUS; SUBCUTANEOUS at 18:12

## 2021-01-10 RX ADMIN — CIPROFLOXACIN SCH MG: 250 TABLET, FILM COATED ORAL at 05:33

## 2021-01-10 RX ADMIN — DEXTROSE MONOHYDRATE SCH MG: 50 INJECTION, SOLUTION INTRAVENOUS at 09:27

## 2021-01-10 RX ADMIN — ASPIRIN SCH MG: 81 TABLET ORAL at 09:27

## 2021-01-10 RX ADMIN — ATORVASTATIN CALCIUM SCH MG: 20 TABLET, FILM COATED ORAL at 09:29

## 2021-01-10 RX ADMIN — CIPROFLOXACIN SCH MG: 250 TABLET, FILM COATED ORAL at 18:12

## 2021-01-10 RX ADMIN — INSULIN LISPRO SCH UNITS: 100 INJECTION, SOLUTION INTRAVENOUS; SUBCUTANEOUS at 21:00

## 2021-01-10 RX ADMIN — SODIUM CHLORIDE SCH MLS/HR: 9 INJECTION, SOLUTION INTRAVENOUS at 09:41

## 2021-01-10 RX ADMIN — ENOXAPARIN SODIUM SCH MG: 40 INJECTION SUBCUTANEOUS at 01:00

## 2021-01-10 NOTE — IPNPDOC
Text Note


Date of Service


The patient was seen on 1/10/21.





NOTE


SUBJECTIVE:


-Remains on 4L NC, significantly weak, aware that he likely will be requiring 

rehab before getting home


-This morning denies chest pain, palpitations





PHYSICAL EXAMINATION:


VITAL SIGNS: See below


GENERAL APPEARANCE: Ill appearing, however without respiratory distress, nasal 

canula in place, with poor dentition 


HEENT: Moist mucous membranes. No JVD


CARDIOVASCULAR: Tachycardic but regular rhythm, S1, S2, no noted murmurs, rubs 

or gallops


LUNGS: Diminished, with poor effort this morning, no wheezing, no rales or 

rhonchi. 


ABDOMEN: Normoactive bowel sounds, soft, nontender, nondistended. No rebound or 

guarding. Has urostomy bag


EXTREMITIES: No cyanosis, clubbing or pitting edema


NEURO: AOx3, moving all extremities, clear speech





LABORATORY DATA: reviewed





IMAGIN/5/21 CT HEAD





FINDINGS:


Age-related atrophy with periventricular leukomalacia and microvascular ischemic


changes are appreciated. The ventricles and sulci are symmetric. Gray-white


differentiation is maintained.  There is no evidence for acute intracranial


hemorrhage, mass/mass effect, pathology or infarction.  No extra-axial fluid


collection.  Calvarium is intact.  Paranasal sinuses and mastoid air cells are 

clear.


Incidental 3 cm sebaceous cyst along the posterior midline aspect of the 

calvarium.





IMPRESSION:


Age related atrophy and microvascular ischemic changes.


No acute intracranial hemorrhage, infarction, or mass/mass effect.








21 CXR:





FINDINGS:


There is no acute infiltrate or pulmonary edema.  The heart is normal in size.  

The


mediastinal silhouette is unchanged.  The visualized osseous structures are


unremarkable.





IMPRESSION:


NO ACUTE PULMONARY DISEASE.








MICROBIOLOGY: Please see below. 





ASSESSMENT: 


85-year-old M who lives alone in a one-story home and usually very active at 

baseline with past medical history significant for type 2 diabetes, bladder CA 

status post robotic radical cystoscopy prostatectomy 2018 with 

urostomy, chronic kidney disease stage III, hyperlipidemia, who was bought in 

after being found down and now admitted for DKA, renal failure due to 

rhabdomyolysis, Covid-19 infection with hypoxemic respiratory failure, sepsis 

likely 2/2 chronic urostomy associated UTI.





PLAN:





Acute diabetic ketoacidosis like 2/2 sepsis 2/2 UTI and covid-19 infection: 

resolved


-s/p insulin gtt --> gap closed --> now on 80u QHS levemir. Will reduce to 60u 

levemir with fasting sugar of 80 this morning, now that he is s/p steroids


-high risk for DKA now that he will be receiving steroids for covid-19


-consistent carb diet 


-FSBG AC/HS, SSI AC/HS, hypoglycemia protocol





DM:


-plan per above 





LENNY on CKD 2/2 dehydration, sepsis and rhabdo: resolved, back to recent baseline


-s/p fluids


-strict I/Os


-has urostomy


-nephrology e-consulted, appreciate recs. Signed off today


-daily weights





Acute rhabdomyolysis on chronic kidney disease stage III: continues to improve 


-Strict I/Os and daily weights. 


-s/p fluids 


-Avoid nephrotoxins. 


-Renally dose all medications





History of bladder tumor s/p TURBT robotic radical cystoscopy prostatectomy Fe

2018 with urostomy


-Urostomy care





Sepsis with UTI in the setting of chronic urostomy:


-f/u BCx


-Fresh UA on urostomy change this AM


-PO cipro for providencia UTI, day 5. Will dc today.


-elevated procalcitonin so there is possible bacterial PNA as well that should 

be covered by abx above





Covid-19 PNA: 


-with initial D-dimer> 4000, on 40mg BID lovenox


-s/p 3d of dex 6mg daily 


-remdesivir, day #4


-strict covid labs per protocol


-supplemental oxygen with goal >92%


-incentive spirometry


-droplet precautions





HLD:


-continue home statin at daily dosing





Grade 1 LV diastolic CHF, compensated


-euvolemic on exam


-Strict i/os. 


-Daily weights





Deconditioning and debility status post fall at home. 


-ARU screen once clinically stable


-fall precautions. 


-Activity as tolerated.


-cont PT/OT, will need rehab





DVT prophylaxis. Compression stockings and lovenox BID





Dispo: Medsurg, 4 main





VS,Fishbone, I+O


VS, Fishbone, I+O


Laboratory Tests


1/10/21 06:25











Vital Signs








  Date Time  Temp Pulse Resp B/P (MAP) Pulse Ox O2 Delivery O2 Flow Rate FiO2


 


1/10/21 04:30 96.7 61 18 115/68 (84) 95 Nasal Cannula 4.0 














I&O- Last 24 Hours up to 6 AM 


 


 1/10/21





 06:00


 


Intake Total 1770 ml


 


Output Total 1560 ml


 


Balance 210 ml

















LOLA GARCIA MD   Walter 10, 2021 08:38

## 2021-01-11 VITALS — SYSTOLIC BLOOD PRESSURE: 104 MMHG | DIASTOLIC BLOOD PRESSURE: 52 MMHG

## 2021-01-11 VITALS — SYSTOLIC BLOOD PRESSURE: 106 MMHG | DIASTOLIC BLOOD PRESSURE: 53 MMHG

## 2021-01-11 VITALS — SYSTOLIC BLOOD PRESSURE: 115 MMHG | DIASTOLIC BLOOD PRESSURE: 64 MMHG

## 2021-01-11 LAB
ALBUMIN SERPL BCG-MCNC: 2 GM/DL (ref 3.2–5.2)
ALT SERPL W P-5'-P-CCNC: 168 U/L (ref 12–78)
APTT BLD: 34.4 SECONDS (ref 24.2–38.5)
BILIRUB CONJ SERPL-MCNC: 0.2 MG/DL (ref 0–0.2)
BILIRUB SERPL-MCNC: 0.6 MG/DL (ref 0.2–1)
BUN SERPL-MCNC: 45 MG/DL (ref 7–18)
CALCIUM SERPL-MCNC: 8.2 MG/DL (ref 8.8–10.2)
CHLORIDE SERPL-SCNC: 110 MEQ/L (ref 98–107)
CK SERPL-CCNC: 138 U/L (ref 39–308)
CO2 SERPL-SCNC: 25 MEQ/L (ref 21–32)
CREAT SERPL-MCNC: 1.36 MG/DL (ref 0.7–1.3)
CRP SERPL-MCNC: 10.8 MG/DL (ref 0–0.3)
D DIMER PPP DDU-MCNC: 1442.13 NG/ML (ref ?–500)
FERRITIN SERPL-MCNC: 586 NG/ML (ref 26–388)
FIBRINOGEN PPP-MCNC: 518 MG/DL (ref 221–452)
GFR SERPL CREATININE-BSD FRML MDRD: 53 ML/MIN/{1.73_M2} (ref 35–?)
GLUCOSE SERPL-MCNC: 109 MG/DL (ref 70–100)
HCT VFR BLD AUTO: 41.6 % (ref 42–52)
HGB BLD-MCNC: 13.2 G/DL (ref 13.5–17.5)
INR PPP: 1.28
LYMPHOCYTES NFR BLD MANUAL: 8 % (ref 16–44)
MAGNESIUM SERPL-MCNC: 2.1 MG/DL (ref 1.8–2.4)
MCH RBC QN AUTO: 29 PG (ref 27–33)
MCHC RBC AUTO-ENTMCNC: 31.7 G/DL (ref 32–36.5)
MCV RBC AUTO: 91.4 FL (ref 80–96)
MONOCYTES NFR BLD MANUAL: 5 % (ref 0–5)
NEUTROPHILS NFR BLD MANUAL: 81 % (ref 28–66)
NT-PRO BNP: 152 PG/ML (ref ?–450)
PLATELET # BLD AUTO: 277 10^3/UL (ref 150–450)
PLATELET BLD QL SMEAR: NORMAL
POTASSIUM SERPL-SCNC: 3.8 MEQ/L (ref 3.5–5.1)
PROT SERPL-MCNC: 5.6 GM/DL (ref 6.4–8.2)
PROTHROMBIN TIME: 16.2 SECONDS (ref 12.5–14.3)
RBC # BLD AUTO: 4.55 10^6/UL (ref 4.3–6.1)
RBC MORPH BLD: NORMAL
SODIUM SERPL-SCNC: 142 MEQ/L (ref 136–145)
VARIANT LYMPHS NFR BLD MANUAL: 6 % (ref 0–5)
WBC # BLD AUTO: 9.2 10^3/UL (ref 4–10)

## 2021-01-11 RX ADMIN — INSULIN LISPRO SCH UNITS: 100 INJECTION, SOLUTION INTRAVENOUS; SUBCUTANEOUS at 12:00

## 2021-01-11 RX ADMIN — INSULIN DETEMIR SCH UNITS: 100 INJECTION, SOLUTION SUBCUTANEOUS at 22:05

## 2021-01-11 RX ADMIN — CIPROFLOXACIN SCH MG: 250 TABLET, FILM COATED ORAL at 05:39

## 2021-01-11 RX ADMIN — ATORVASTATIN CALCIUM SCH MG: 20 TABLET, FILM COATED ORAL at 09:49

## 2021-01-11 RX ADMIN — ENOXAPARIN SODIUM SCH MG: 40 INJECTION SUBCUTANEOUS at 01:15

## 2021-01-11 RX ADMIN — DEXTROSE MONOHYDRATE SCH MG: 50 INJECTION, SOLUTION INTRAVENOUS at 09:49

## 2021-01-11 RX ADMIN — INSULIN LISPRO SCH UNITS: 100 INJECTION, SOLUTION INTRAVENOUS; SUBCUTANEOUS at 17:30

## 2021-01-11 RX ADMIN — INSULIN LISPRO SCH UNITS: 100 INJECTION, SOLUTION INTRAVENOUS; SUBCUTANEOUS at 21:00

## 2021-01-11 RX ADMIN — ENOXAPARIN SODIUM SCH MG: 40 INJECTION SUBCUTANEOUS at 13:29

## 2021-01-11 RX ADMIN — ASPIRIN SCH MG: 81 TABLET ORAL at 09:49

## 2021-01-11 RX ADMIN — INSULIN LISPRO SCH UNITS: 100 INJECTION, SOLUTION INTRAVENOUS; SUBCUTANEOUS at 07:30

## 2021-01-11 RX ADMIN — MULTIPLE VITAMINS W/ MINERALS TAB SCH TAB: TAB at 09:49

## 2021-01-11 NOTE — IPNPDOC
Text Note


Date of Service


The patient was seen on 21.





NOTE


SUBJECTIVE:


-Remains on 4L NC, reports feeling well


-This morning denies chest pain, palpitations





PHYSICAL EXAMINATION:


VITAL SIGNS: See below


GENERAL APPEARANCE: NAD, nasal canula in place, eating breakfast in bed


HEENT: Moist mucous membranes. No JVD


CARDIOVASCULAR: Tachycardic but regular rhythm, S1, S2, no noted murmurs, rubs 

or gallops


LUNGS: Diminished, no wheezing, no rales or rhonchi. 


ABDOMEN: Normoactive bowel sounds, soft, nontender, nondistended. No rebound or 

guarding. Has urostomy bag iwth clear yellow urine


EXTREMITIES: No cyanosis, clubbing or pitting edema


NEURO: AOx3, moving all extremities, clear speech





LABORATORY DATA: reviewed. Downtrending inflammatory markers except for CRP that

uptrended to 10.8 from 3.65





IMAGIN/5/21 CT HEAD





FINDINGS:


Age-related atrophy with periventricular leukomalacia and microvascular ischemic


changes are appreciated. The ventricles and sulci are symmetric. Gray-white


differentiation is maintained.  There is no evidence for acute intracranial


hemorrhage, mass/mass effect, pathology or infarction.  No extra-axial fluid


collection.  Calvarium is intact.  Paranasal sinuses and mastoid air cells are 

clear.


Incidental 3 cm sebaceous cyst along the posterior midline aspect of the 

calvarium.





IMPRESSION:


Age related atrophy and microvascular ischemic changes.


No acute intracranial hemorrhage, infarction, or mass/mass effect.








21 CXR:





FINDINGS:


There is no acute infiltrate or pulmonary edema.  The heart is normal in size.  

The


mediastinal silhouette is unchanged.  The visualized osseous structures are


unremarkable.





IMPRESSION:


NO ACUTE PULMONARY DISEASE.








MICROBIOLOGY: Please see below. 





ASSESSMENT: 


85-year-old M who lives alone in a one-story home and usually very active at 

baseline with past medical history significant for type 2 diabetes, bladder CA 

status post robotic radical cystoscopy prostatectomy 2018 with 

urostomy, chronic kidney disease stage III, hyperlipidemia, who was bought in 

after being found down and now admitted for DKA, renal failure due to 

rhabdomyolysis, Covid-19 infection with hypoxemic respiratory failure, sepsis 

likely 2/2 chronic urostomy associated UTI.





PLAN:





Acute diabetic ketoacidosis like 2/2 sepsis 2/2 UTI and covid-19 infection: 

resolved


-s/p insulin gtt 


-continue 60u levemir 


-consistent carb diet 


-FSBG AC/HS, SSI AC/HS, hypoglycemia protocol





DM:


-continue 60u levemir 


-consistent carb diet 


-FSBG AC/HS, SSI AC/HS, hypoglycemia protocol








LENNY on CKD 2/2 dehydration, sepsis and rhabdo: resolved, back to recent baseline


-s/p fluids


-strict I/Os


-has urostomy


-nephrology e-consulted, appreciate recs. Signed off today


-daily weights





Acute rhabdomyolysis on chronic kidney disease stage III: continues to improve 


-Strict I/Os and daily weights. 


-s/p fluids 


-Avoid nephrotoxins. 


-Renally dose all medications





History of bladder tumor s/p TURBT robotic radical cystoscopy prostatectomy 

2018 with urostomy


-Urostomy care





Sepsis with UTI in the setting of chronic urostomy:


-f/u BCx


-Fresh UA on urostomy change this AM


-Had 5d of appropriate abx therapy for Providencia UTI


-elevated procalcitonin, likely acute phase reactant, however did get 5d of abx 

that also cover for CAP





Covid-19 PNA: 


-with initial D-dimer> 4000, on 40mg BID lovenox


-s/p 3d of dex 6mg daily 


-remdesivir, day #5, completed.


-strict covid labs per protocol


-supplemental oxygen with goal >92%


-incentive spirometry


-droplet precautions





HLD:


-continue home statin at daily dosing





Grade 1 LV diastolic CHF, compensated


-euvolemic on exam


-Strict i/os. 


-Daily weights





Deconditioning and debility status post fall at home. 


-ARU screen once clinically stable


-fall precautions. 


-Activity as tolerated.


-cont PT/OT, will need rehab





DVT prophylaxis. Compression stockings and lovenox BID





Dispo: Medsurg, 4 main





VS,Fishbone, I+O


VS, Fishbone, I+O


Laboratory Tests


21 10:05











Vital Signs








  Date Time  Temp Pulse Resp B/P (MAP) Pulse Ox O2 Delivery O2 Flow Rate FiO2


 


21 09:49  68  115/64    


 


21 09:00       4.0 


 


21 04:00 98.1  20  94 Nasal Cannula  














I&O- Last 24 Hours up to 6 AM 


 


 21





 06:00


 


Intake Total 1380 ml


 


Output Total 2775 ml


 


Balance -1395 ml

















LOLA GARCIA MD   2021 15:00

## 2021-01-12 VITALS — DIASTOLIC BLOOD PRESSURE: 59 MMHG | SYSTOLIC BLOOD PRESSURE: 110 MMHG

## 2021-01-12 VITALS — DIASTOLIC BLOOD PRESSURE: 55 MMHG | SYSTOLIC BLOOD PRESSURE: 109 MMHG

## 2021-01-12 VITALS — SYSTOLIC BLOOD PRESSURE: 115 MMHG | DIASTOLIC BLOOD PRESSURE: 57 MMHG

## 2021-01-12 LAB
ALBUMIN SERPL BCG-MCNC: 1.8 GM/DL (ref 3.2–5.2)
ALT SERPL W P-5'-P-CCNC: 124 U/L (ref 12–78)
BILIRUB SERPL-MCNC: 0.7 MG/DL (ref 0.2–1)
BUN SERPL-MCNC: 40 MG/DL (ref 7–18)
CALCIUM SERPL-MCNC: 7.7 MG/DL (ref 8.8–10.2)
CHLORIDE SERPL-SCNC: 111 MEQ/L (ref 98–107)
CO2 SERPL-SCNC: 24 MEQ/L (ref 21–32)
CREAT SERPL-MCNC: 1.29 MG/DL (ref 0.7–1.3)
CRP SERPL-MCNC: 8.81 MG/DL (ref 0–0.3)
D DIMER PPP DDU-MCNC: 937.06 NG/ML (ref ?–500)
EOSINOPHIL NFR BLD MANUAL: 2 % (ref 0–3)
FERRITIN SERPL-MCNC: 563 NG/ML (ref 26–388)
FIBRINOGEN PPP-MCNC: 650 MG/DL (ref 221–452)
GFR SERPL CREATININE-BSD FRML MDRD: 56.4 ML/MIN/{1.73_M2} (ref 35–?)
GLUCOSE SERPL-MCNC: 136 MG/DL (ref 70–100)
HCT VFR BLD AUTO: 37.3 % (ref 42–52)
HCT VFR BLD AUTO: 39.1 % (ref 42–52)
HGB BLD-MCNC: 11.9 G/DL (ref 13.5–17.5)
HGB BLD-MCNC: 12.6 G/DL (ref 13.5–17.5)
LDH SERPL L TO P-CCNC: 292 U/L (ref 87–241)
LYMPHOCYTES NFR BLD MANUAL: 10 % (ref 16–44)
LYMPHOCYTES NFR BLD MANUAL: 18 % (ref 16–44)
MCH RBC QN AUTO: 29.2 PG (ref 27–33)
MCH RBC QN AUTO: 29.2 PG (ref 27–33)
MCHC RBC AUTO-ENTMCNC: 31.9 G/DL (ref 32–36.5)
MCHC RBC AUTO-ENTMCNC: 32.2 G/DL (ref 32–36.5)
MCV RBC AUTO: 90.5 FL (ref 80–96)
MCV RBC AUTO: 91.4 FL (ref 80–96)
METAMYELOCYTES NFR BLD MANUAL: 1 % (ref 0–0)
METAMYELOCYTES NFR BLD MANUAL: 2 % (ref 0–0)
MONOCYTES NFR BLD MANUAL: 5 % (ref 0–5)
MONOCYTES NFR BLD MANUAL: 9 % (ref 0–5)
MYELOBLASTS NFR BLD MANUAL: 1 % (ref 0–0)
NEUTROPHILS NFR BLD MANUAL: 66 % (ref 28–66)
NEUTROPHILS NFR BLD MANUAL: 72 % (ref 28–66)
PLATELET # BLD AUTO: 321 10^3/UL (ref 150–450)
PLATELET # BLD AUTO: 338 10^3/UL (ref 150–450)
PLATELET BLD QL SMEAR: NORMAL
PLATELET BLD QL SMEAR: NORMAL
PLATELET CLUMP BLD QL SMEAR: (no result)
POTASSIUM SERPL-SCNC: 3.9 MEQ/L (ref 3.5–5.1)
PROT SERPL-MCNC: 4.9 GM/DL (ref 6.4–8.2)
RBC # BLD AUTO: 4.08 10^6/UL (ref 4.3–6.1)
RBC # BLD AUTO: 4.32 10^6/UL (ref 4.3–6.1)
SODIUM SERPL-SCNC: 140 MEQ/L (ref 136–145)
VARIANT LYMPHS NFR BLD MANUAL: 5 % (ref 0–5)
VARIANT LYMPHS NFR BLD MANUAL: 5 % (ref 0–5)
WBC # BLD AUTO: 8 10^3/UL (ref 4–10)
WBC # BLD AUTO: 8.5 10^3/UL (ref 4–10)

## 2021-01-12 RX ADMIN — INSULIN LISPRO SCH UNITS: 100 INJECTION, SOLUTION INTRAVENOUS; SUBCUTANEOUS at 17:52

## 2021-01-12 RX ADMIN — INSULIN LISPRO SCH UNITS: 100 INJECTION, SOLUTION INTRAVENOUS; SUBCUTANEOUS at 07:30

## 2021-01-12 RX ADMIN — ENOXAPARIN SODIUM SCH MG: 40 INJECTION SUBCUTANEOUS at 01:18

## 2021-01-12 RX ADMIN — ATORVASTATIN CALCIUM SCH MG: 20 TABLET, FILM COATED ORAL at 08:47

## 2021-01-12 RX ADMIN — MULTIPLE VITAMINS W/ MINERALS TAB SCH TAB: TAB at 08:46

## 2021-01-12 RX ADMIN — ENOXAPARIN SODIUM SCH MG: 40 INJECTION SUBCUTANEOUS at 13:02

## 2021-01-12 RX ADMIN — ASPIRIN SCH MG: 81 TABLET ORAL at 08:46

## 2021-01-12 RX ADMIN — DEXTROSE MONOHYDRATE SCH MG: 50 INJECTION, SOLUTION INTRAVENOUS at 08:46

## 2021-01-12 RX ADMIN — INSULIN DETEMIR SCH UNITS: 100 INJECTION, SOLUTION SUBCUTANEOUS at 21:07

## 2021-01-12 RX ADMIN — INSULIN LISPRO SCH UNITS: 100 INJECTION, SOLUTION INTRAVENOUS; SUBCUTANEOUS at 13:04

## 2021-01-12 RX ADMIN — INSULIN LISPRO SCH UNITS: 100 INJECTION, SOLUTION INTRAVENOUS; SUBCUTANEOUS at 21:00

## 2021-01-12 NOTE — IPNPDOC
Text Note


Date of Service


1/12/2021





NOTE


Subjective:


Patient is an 85-year-old  male with a PMHx of DM2, Bladder CA s/p 

Robotic radical cystectomy / prostatectomy / urostomy (2/2018), CKD3, DLP, who 

presented to the emergency room after he was found down. Patient was admitted to

hospital service for DKA (2/2 UTI) and was also found to be COVID19 positive 

with hypoxia. 





Patient was seen and examined at the bedside. Currently patient reports that he 

is doing relatively well. Denies any chest pain, shortness of breath, 

palpitations, nausea, vomiting, abdominal pain or diarrhea.





Objective:


Vitals (See below)


General: Lying in bed, no acute distress, comfortable, Awake / Alert 


HEENT: NC, AT


CVS: RRR, +S1S2


Lungs: Fair air entry b/l, -w/r/r


Abdomen: Soft, ND, NT


Extremities: - Edema, - Calf tenderness





Imaging:


CXR 1/5:


NO ACUTE PULMONARY DISEASE.





CT head 1/5:


Age related atrophy and microvascular ischemic changes. No acute intracranial 

hemorrhage, infarction, or mass/mass effect.





Renal US 1/5:


Negative renal sonogram. 





CXR 1/6:


Relatively stable examination similar to multiple prior examinations.  Very 

subtle superimposed atelectasis cannot be excluded.





Assessment and plan:


s/p DKA - 2/2 Sepsis 2/2 UTI / COVID19


- s/p Insulin drip





IDDM2


- c/w ISS and Levemir





Sepsis - 2/2 UTI - 2/2 Polymicrobial urinary tract infection


- s/p Ciprofloxacin 


- Will start Levofloxacin 





LENNY on CKD 2/2 dehydration, sepsis and rhabdo: resolved, back to recent baseline


- s/p fluids


- Nephrology was consulted; appreciated their recommendations 





s/p Acute rhabdomyolysis 


- s/p fluids 





History of bladder tumor 


- s/p TURBT robotic radical cystoscopy / prostatectomy / urostomy (2/2018)





COVID 19 pneumonia / Mild Hypoxia 


- Currently patient reports to be asymptomatic


- Requiring 4L NC oxygen over the last 4 days 


- Inflammatory markers will continue to be trended 


- s/p Remdesivir 


- c/w incentive spirometry


- supplemental oxygen to maintain saturation >92%





HTN


- c/w Amlodipine 





DLP


- c/w Atorvastatin 





Chronic Grade 1 LV Diastolic CHF


- No evidence of exacerbation 





Deconditioning and debility status post fall at home


- Will c/w PT and OT


- PFS looking for swing bed placement within next 24-48 hours 





GERD


- c/w Protonix 





DVT prophylaxis


- c/w Lovenox 





Dispo: 


- Transition to ALC status


- Anticipate transition to swing-bed within 24 hours





VS,Fishbone, I+O


VS, Fishbone, I+O


Laboratory Tests


1/12/21 06:32











Vital Signs








  Date Time  Temp Pulse Resp B/P (MAP) Pulse Ox O2 Delivery O2 Flow Rate FiO2


 


1/12/21 12:00 98.6 91 19 115/57 (76) 93 Nasal Cannula 4.0 














I&O- Last 24 Hours up to 6 AM 


 


 1/12/21





 05:59


 


Intake Total 780 ml


 


Output Total 1100 ml


 


Balance -320 ml

















VIDAL MORRIS MD                Jan 12, 2021 13:44

## 2021-01-13 VITALS — SYSTOLIC BLOOD PRESSURE: 101 MMHG | DIASTOLIC BLOOD PRESSURE: 60 MMHG

## 2021-01-13 VITALS — SYSTOLIC BLOOD PRESSURE: 109 MMHG | DIASTOLIC BLOOD PRESSURE: 59 MMHG

## 2021-01-13 LAB
ALBUMIN SERPL BCG-MCNC: 1.8 GM/DL (ref 3.2–5.2)
ALT SERPL W P-5'-P-CCNC: 105 U/L (ref 12–78)
APTT BLD: 41.1 SECONDS (ref 24.2–38.5)
BILIRUB CONJ SERPL-MCNC: 0.3 MG/DL (ref 0–0.2)
BILIRUB SERPL-MCNC: 0.6 MG/DL (ref 0.2–1)
BUN SERPL-MCNC: 32 MG/DL (ref 7–18)
CALCIUM SERPL-MCNC: 7.8 MG/DL (ref 8.8–10.2)
CHLORIDE SERPL-SCNC: 111 MEQ/L (ref 98–107)
CO2 SERPL-SCNC: 24 MEQ/L (ref 21–32)
CREAT SERPL-MCNC: 1.28 MG/DL (ref 0.7–1.3)
CRP SERPL-MCNC: 7.34 MG/DL (ref 0–0.3)
D DIMER PPP DDU-MCNC: 940.36 NG/ML (ref ?–500)
EOSINOPHIL NFR BLD MANUAL: 1 % (ref 0–3)
FERRITIN SERPL-MCNC: 525 NG/ML (ref 26–388)
FIBRINOGEN PPP-MCNC: 663 MG/DL (ref 221–452)
GFR SERPL CREATININE-BSD FRML MDRD: 56.9 ML/MIN/{1.73_M2} (ref 35–?)
GLUCOSE SERPL-MCNC: 59 MG/DL (ref 70–100)
HCT VFR BLD AUTO: 37 % (ref 42–52)
HGB BLD-MCNC: 11.5 G/DL (ref 13.5–17.5)
INR PPP: 1.18
LDH SERPL L TO P-CCNC: 290 U/L (ref 87–241)
LYMPHOCYTES NFR BLD MANUAL: 16 % (ref 16–44)
MAGNESIUM SERPL-MCNC: 2 MG/DL (ref 1.8–2.4)
MCH RBC QN AUTO: 28.4 PG (ref 27–33)
MCHC RBC AUTO-ENTMCNC: 31.1 G/DL (ref 32–36.5)
MCV RBC AUTO: 91.4 FL (ref 80–96)
MONOCYTES NFR BLD MANUAL: 7 % (ref 0–5)
NEUTROPHILS NFR BLD MANUAL: 76 % (ref 28–66)
NT-PRO BNP: 93 PG/ML (ref ?–450)
PLATELET # BLD AUTO: 391 10^3/UL (ref 150–450)
PLATELET BLD QL SMEAR: NORMAL
POTASSIUM SERPL-SCNC: 4.1 MEQ/L (ref 3.5–5.1)
PROT SERPL-MCNC: 4.8 GM/DL (ref 6.4–8.2)
PROTHROMBIN TIME: 15.3 SECONDS (ref 12.5–14.3)
RBC # BLD AUTO: 4.05 10^6/UL (ref 4.3–6.1)
RBC MORPH BLD: NORMAL
SODIUM SERPL-SCNC: 141 MEQ/L (ref 136–145)
WBC # BLD AUTO: 6.7 10^3/UL (ref 4–10)

## 2021-01-13 RX ADMIN — DEXTROSE MONOHYDRATE SCH MG: 50 INJECTION, SOLUTION INTRAVENOUS at 10:00

## 2021-01-13 RX ADMIN — INSULIN LISPRO SCH UNITS: 100 INJECTION, SOLUTION INTRAVENOUS; SUBCUTANEOUS at 13:02

## 2021-01-13 RX ADMIN — ASPIRIN SCH MG: 81 TABLET ORAL at 10:00

## 2021-01-13 RX ADMIN — MULTIPLE VITAMINS W/ MINERALS TAB SCH TAB: TAB at 10:00

## 2021-01-13 RX ADMIN — INSULIN DETEMIR SCH UNITS: 100 INJECTION, SOLUTION SUBCUTANEOUS at 21:23

## 2021-01-13 RX ADMIN — INSULIN LISPRO SCH UNITS: 100 INJECTION, SOLUTION INTRAVENOUS; SUBCUTANEOUS at 21:23

## 2021-01-13 RX ADMIN — INSULIN LISPRO SCH UNITS: 100 INJECTION, SOLUTION INTRAVENOUS; SUBCUTANEOUS at 07:30

## 2021-01-13 RX ADMIN — INSULIN LISPRO SCH UNITS: 100 INJECTION, SOLUTION INTRAVENOUS; SUBCUTANEOUS at 16:59

## 2021-01-13 RX ADMIN — ENOXAPARIN SODIUM SCH MG: 40 INJECTION SUBCUTANEOUS at 13:03

## 2021-01-13 RX ADMIN — ATORVASTATIN CALCIUM SCH MG: 20 TABLET, FILM COATED ORAL at 10:00

## 2021-01-13 RX ADMIN — ENOXAPARIN SODIUM SCH MG: 40 INJECTION SUBCUTANEOUS at 00:54

## 2021-01-14 VITALS — SYSTOLIC BLOOD PRESSURE: 102 MMHG | DIASTOLIC BLOOD PRESSURE: 52 MMHG

## 2021-01-14 VITALS — SYSTOLIC BLOOD PRESSURE: 106 MMHG | DIASTOLIC BLOOD PRESSURE: 55 MMHG

## 2021-01-14 VITALS — DIASTOLIC BLOOD PRESSURE: 61 MMHG | SYSTOLIC BLOOD PRESSURE: 110 MMHG

## 2021-01-14 LAB
ALBUMIN SERPL BCG-MCNC: 1.9 GM/DL (ref 3.2–5.2)
ALT SERPL W P-5'-P-CCNC: 87 U/L (ref 12–78)
APTT BLD: 40.3 SECONDS (ref 24.2–38.5)
BASOPHILS # BLD AUTO: 0 10^3/UL (ref 0–0.2)
BASOPHILS NFR BLD AUTO: 0.3 % (ref 0–1)
BILIRUB CONJ SERPL-MCNC: 0.3 MG/DL (ref 0–0.2)
BILIRUB SERPL-MCNC: 0.5 MG/DL (ref 0.2–1)
BUN SERPL-MCNC: 26 MG/DL (ref 7–18)
CALCIUM SERPL-MCNC: 7.6 MG/DL (ref 8.8–10.2)
CHLORIDE SERPL-SCNC: 111 MEQ/L (ref 98–107)
CO2 SERPL-SCNC: 25 MEQ/L (ref 21–32)
CREAT SERPL-MCNC: 1.23 MG/DL (ref 0.7–1.3)
CRP SERPL-MCNC: 5.23 MG/DL (ref 0–0.3)
D DIMER PPP DDU-MCNC: 910.54 NG/ML (ref ?–500)
EOSINOPHIL # BLD AUTO: 0.1 10^3/UL (ref 0–0.5)
EOSINOPHIL NFR BLD AUTO: 0.8 % (ref 0–3)
FERRITIN SERPL-MCNC: 579 NG/ML (ref 26–388)
FIBRINOGEN PPP-MCNC: 680 MG/DL (ref 221–452)
GFR SERPL CREATININE-BSD FRML MDRD: 59.5 ML/MIN/{1.73_M2} (ref 35–?)
GLUCOSE SERPL-MCNC: 30 MG/DL (ref 70–100)
HCT VFR BLD AUTO: 38.9 % (ref 42–52)
HGB BLD-MCNC: 12.1 G/DL (ref 13.5–17.5)
INR PPP: 1.19
LDH SERPL L TO P-CCNC: 291 U/L (ref 87–241)
LYMPHOCYTES # BLD AUTO: 1.5 10^3/UL (ref 1.5–5)
LYMPHOCYTES NFR BLD AUTO: 20.2 % (ref 24–44)
MAGNESIUM SERPL-MCNC: 2.1 MG/DL (ref 1.8–2.4)
MCH RBC QN AUTO: 28.9 PG (ref 27–33)
MCHC RBC AUTO-ENTMCNC: 31.1 G/DL (ref 32–36.5)
MCV RBC AUTO: 93.1 FL (ref 80–96)
MONOCYTES # BLD AUTO: 0.9 10^3/UL (ref 0–0.8)
MONOCYTES NFR BLD AUTO: 12.3 % (ref 0–5)
NEUTROPHILS # BLD AUTO: 4.5 10^3/UL (ref 1.5–8.5)
NEUTROPHILS NFR BLD AUTO: 62.5 % (ref 36–66)
NT-PRO BNP: 81 PG/ML (ref ?–450)
PLATELET # BLD AUTO: 476 10^3/UL (ref 150–450)
POTASSIUM SERPL-SCNC: 4 MEQ/L (ref 3.5–5.1)
PROT SERPL-MCNC: 5.1 GM/DL (ref 6.4–8.2)
PROTHROMBIN TIME: 15.3 SECONDS (ref 12.5–14.3)
RBC # BLD AUTO: 4.18 10^6/UL (ref 4.3–6.1)
SODIUM SERPL-SCNC: 142 MEQ/L (ref 136–145)
WBC # BLD AUTO: 7.3 10^3/UL (ref 4–10)

## 2021-01-14 RX ADMIN — ASPIRIN SCH MG: 81 TABLET ORAL at 08:42

## 2021-01-14 RX ADMIN — MULTIPLE VITAMINS W/ MINERALS TAB SCH TAB: TAB at 08:42

## 2021-01-14 RX ADMIN — INSULIN LISPRO SCH UNITS: 100 INJECTION, SOLUTION INTRAVENOUS; SUBCUTANEOUS at 12:58

## 2021-01-14 RX ADMIN — ATORVASTATIN CALCIUM SCH MG: 20 TABLET, FILM COATED ORAL at 08:42

## 2021-01-14 RX ADMIN — INSULIN LISPRO SCH UNITS: 100 INJECTION, SOLUTION INTRAVENOUS; SUBCUTANEOUS at 07:15

## 2021-01-14 RX ADMIN — ENOXAPARIN SODIUM SCH MG: 40 INJECTION SUBCUTANEOUS at 01:34

## 2021-01-14 RX ADMIN — ENOXAPARIN SODIUM SCH MG: 40 INJECTION SUBCUTANEOUS at 12:58

## 2021-01-14 NOTE — DS.PDOC
Discharge Summary


General


Date of Admission


Jan 5, 2021 at 14:59


Date of Discharge


1/14/2021





Discharge Summary


PROCEDURES PERFORMED DURING STAY: 


[None].





ADMITTING DIAGNOSES / DISCHARGE DIAGNOSES:


s/p DKA - 2/2 Sepsis 2/2 UTI / COVID19


IDDM2


Sepsis - 2/2 UTI - 2/2 Polymicrobial urinary tract infection


LENNY on CKD 2/2 dehydration, sepsis and rhabdo: resolved, back to recent baseline


s/p Acute rhabdomyolysis 


History of bladder tumor 


COVID 19 pneumonia / Mild Hypoxia 


HTN


DLP


Chronic Grade 1 LV Diastolic CHF


Deconditioning and debility status post fall at home


GERD


DVT prophylaxis





COMPLICATIONS/CHIEF COMPLAINT: 


Elevated Glucose 





HISTORY OF PRESENT ILLNESS:


Patient is an 85-year-old  male with a PMHx of DM2, Bladder CA s/p 

Robotic radical cystectomy / prostatectomy / urostomy (2/2018), CKD3, DLP, who 

presented to the emergency room after he was found down. Patient was admitted to

 hospital service for DKA (2/2 UTI) and was also found to be COVID19 positive 

with hypoxia. 





HOSPITAL COURSE:


s/p DKA - 2/2 Sepsis 2/2 UTI / COVID19


- s/p Insulin drip





IDDM2


- c/w ISS and Levemir





Sepsis - 2/2 UTI - 2/2 Polymicrobial urinary tract infection


- s/p Ciprofloxacin 


- c/w Levofloxacin (Day #3)





LENNY on CKD 2/2 dehydration, sepsis and rhabdo: resolved, back to recent baseline


- s/p fluids


- Nephrology was consulted; appreciated their recommendations 





s/p Acute rhabdomyolysis 


- s/p fluids 





History of bladder tumor 


- s/p TURBT robotic radical cystoscopy / prostatectomy / urostomy (2/2018)





COVID 19 pneumonia / Mild Hypoxia 


- Currently patient reports to be asymptomatic


- Requiring 4L NC oxygen over the last 4 days 


- Inflammatory markers will continue to be trended 


- s/p Remdesivir 


- c/w incentive spirometry


- supplemental oxygen to maintain saturation >92%





HTN


- c/w Amlodipine 





DLP


- c/w Atorvastatin 





Chronic Grade 1 LV Diastolic CHF


- No evidence of exacerbation 





Deconditioning and debility status post fall at home


- Will c/w PT and OT; will transition to [facility] 





GERD


- c/w Protonix 





DVT prophylaxis


- c/w Lovenox 





DISCHARGE MEDICATIONS: 


Please see below.


 


ALLERGIES: 


Please see below.





PHYSICAL EXAMINATION ON DISCHARGE:


Vitals (See below)


General: Lying in bed, appears comfortable, Awake / Alert, Oriented x2 


HEENT: NC, AT


CVS: RRR, +S1S2


Lungs: Fair air entry b/l, no evidence of wheezing / rhonchi / rales 


Abdomen: Soft, non-distended and non-tender, 


Extremities: No evidence of edema, - Calf tenderness





LABORATORY DATA: 


Please see below.





IMAGING:


CXR 1/5:


NO ACUTE PULMONARY DISEASE.





CT head 1/5:


Age related atrophy and microvascular ischemic changes. No acute intracranial 

hemorrhage, infarction, or mass/mass effect.





Renal US 1/5:


Negative renal sonogram. 





CXR 1/6:


Relatively stable examination similar to multiple prior examinations.  Very 

subtle superimposed atelectasis cannot be excluded.





ACTIVITY: 


[As tolerated].





DISCHARGE PLAN: 


Follow up with primary care provider within 7 days





DISPOSITION:


[facility] 





DISCHARGE CONDITION: 


[Stable].





TIME SPENT ON DISCHARGE: 


35 minutes.





CONTACTS:


- Lashae Harry: [phone]


- Bree Stanley: [phone]


- Called and discussed case with Jose Manuel Harry and Bree Stanley and provided them 

with an update about the plan of care and transition to [facility] swing 

bed





Vital Signs/I&Os





Vital Signs








  Date Time  Temp Pulse Resp B/P (MAP) Pulse Ox O2 Delivery O2 Flow Rate FiO2


 


1/14/21 08:42  71  110/61    


 


1/14/21 08:00 96.6  18  91 Nasal Cannula 2.0 














I&O- Last 24 Hours up to 6 AM 


 


 1/14/21





 06:00


 


Intake Total 1200 ml


 


Output Total 2500 ml


 


Balance -1300 ml











Laboratory Data


Labs 24H


Laboratory Tests 2


1/13/21 16:15: Bedside Glucose (Misc Panel) 66L


1/13/21 20:28: Bedside Glucose (Misc Panel) 255H


1/14/21 05:27: 


Immature Granulocyte % (Auto) 3.9H, Neutrophils (%) (Auto) 62.5, Lymphocytes (%)

 (Auto) 20.2L, Monocytes (%) (Auto) 12.3H, Eosinophils (%) (Auto) 0.8, Basophils

 (%) (Auto) 0.3, Neutrophils # (Auto) 4.5, Lymphocytes # (Auto) 1.5, Monocytes #

 (Auto) 0.9H, Eosinophils # (Auto) 0.1, Basophils # (Auto) 0.0, Nucleated Red 

Blood Cells % (auto) 0.0, Prothrombin Time 15.3H, Prothromb Time International 

Ratio 1.19, Activated Partial Thromboplast Time 40.3H, Fibrinogen 680H, D-Dimer,

 Quantitative 910.54H, Anion Gap 6L, Glomerular Filtration Rate 59.5, Calcium 

Level 7.6L, Magnesium Level 2.1, Ferritin 579H, Total Bilirubin 0.5, Direct 

Bilirubin 0.3H, Aspartate Amino Transf (AST/SGOT) 29, Alanine Aminotransferase 

(ALT/SGPT) 87H, Alkaline Phosphatase 108, Lactate Dehydrogenase 291H, C-Reactive

 Protein, Quantitative 5.23H, NT-Pro-B-Type Natriuretic Peptide 81, Total 

Protein 5.1L, Albumin 1.9L, Albumin/Globulin Ratio 0.6


1/14/21 07:38: Bedside Glucose (Misc Panel) 111H


1/14/21 12:05: Bedside Glucose (Misc Panel) 193H


CBC/BMP


Laboratory Tests


1/14/21 05:27








FSBS





Laboratory Tests








Test


 1/13/21


16:15 1/13/21


20:28 1/14/21


07:38 1/14/21


12:05 Range/Units


 


 


Bedside Glucose (Misc Panel) 66 255 111 193   MG/DL











Microbiology





Microbiology


1/6/21 Urine Culture - Final, Complete


         


1/6/21 Urine Culture - Final, Complete


         Providencia Rettgeri


         Klebsiella Pneumoniae


         Staphylococcus Haemolyticus


         Enterococcus Faecalis


         Vagococcus Fluvialis


1/5/21 Blood Culture - Final, Complete


         NO GROWTH AFTER 5 DAYS


1/5/21 Blood Culture - Final, Complete


         NO GROWTH AFTER 5 DAYS





Discharge Medications


Scheduled


Amlodipine Besylate (Amlodipine Besylate) 5 Mg Tablet, 1 TAB PO DAILY


Aspirin (Aspirin EC) 81 Mg Tablet.dr, 81 MG PO DAILY, (Reported)


Atorvastatin Calcium (Atorvastatin Calcium) 40 Mg Tab, 20 MG PO Q2D, (Reported)


Enoxaparin Sodium (Lovenox) 40 Mg/0.4 Ml Syringe, 40 MG SC Q12H


Insulin Glargine (Lantus) 1 Units/0.01 Ml Susp, 15 UNITS SC QHS


Insulin Human Lispro (Humalog) 100 Unit/1 Ml Vial, 0 UNITS SC ACHS


   As per [facility] sliding scale 


Levofloxacin (Levofloxacin) 750 Mg Tablet, 750 MG PO Q48H


Multivitamins (Thera M Plus Tablet) 1 Each Tablet, 1 TAB PO DAILY, (Reported)


Pantoprazole Sodium (Pantoprazole Sodium) 40 Mg Tablet.dr, 40 MG PO DAILY





Miscellaneous Medications


[Med Rec Comment]  , (Reported)


   LIST OBTAINED FROM PT DAUGHTER, SHE STATES PT HAS NOT HAD MEDS IN APPROX 3 

DAYS 





Allergies


Coded Allergies:  


     No Known Allergies (Unverified , 2/27/18)











VIDAL MORRIS MD                Jan 14, 2021 13:37

## 2021-04-29 ENCOUNTER — HOSPITAL ENCOUNTER (OUTPATIENT)
Dept: HOSPITAL 53 - M SMT | Age: 86
End: 2021-04-29
Attending: UROLOGY
Payer: MEDICARE

## 2021-04-29 DIAGNOSIS — Z85.51: Primary | ICD-10-CM

## 2021-04-29 PROCEDURE — 88108 CYTOPATH CONCENTRATE TECH: CPT

## 2021-06-14 ENCOUNTER — HOSPITAL ENCOUNTER (OUTPATIENT)
Dept: HOSPITAL 53 - M SFHCPLAZ | Age: 86
End: 2021-06-14
Attending: INTERNAL MEDICINE
Payer: MEDICARE

## 2021-06-14 DIAGNOSIS — E11.9: ICD-10-CM

## 2021-06-14 DIAGNOSIS — Z85.51: Primary | ICD-10-CM

## 2021-06-14 DIAGNOSIS — E78.5: ICD-10-CM

## 2021-06-14 LAB
ALBUMIN SERPL BCG-MCNC: 3.6 GM/DL (ref 3.2–5.2)
ALT SERPL W P-5'-P-CCNC: 19 U/L (ref 12–78)
BASOPHILS # BLD AUTO: 0 10^3/UL (ref 0–0.2)
BASOPHILS NFR BLD AUTO: 0.4 % (ref 0–1)
BILIRUB SERPL-MCNC: 0.4 MG/DL (ref 0.2–1)
BUN SERPL-MCNC: 21 MG/DL (ref 7–18)
CALCIUM SERPL-MCNC: 8.6 MG/DL (ref 8.8–10.2)
CHLORIDE SERPL-SCNC: 108 MEQ/L (ref 98–107)
CHOLEST SERPL-MCNC: 106 MG/DL (ref ?–200)
CHOLEST/HDLC SERPL: 3.53 {RATIO} (ref ?–5)
CO2 SERPL-SCNC: 27 MEQ/L (ref 21–32)
CREAT SERPL-MCNC: 1.41 MG/DL (ref 0.7–1.3)
EOSINOPHIL # BLD AUTO: 0.1 10^3/UL (ref 0–0.5)
EOSINOPHIL NFR BLD AUTO: 0.9 % (ref 0–3)
EST. AVERAGE GLUCOSE BLD GHB EST-MCNC: 235 MG/DL (ref 60–110)
GFR SERPL CREATININE-BSD FRML MDRD: 50.7 ML/MIN/{1.73_M2} (ref 35–?)
GLUCOSE SERPL-MCNC: 266 MG/DL (ref 70–100)
HCT VFR BLD AUTO: 38.7 % (ref 42–52)
HDLC SERPL-MCNC: 30 MG/DL (ref 40–?)
HGB BLD-MCNC: 12.4 G/DL (ref 13.5–17.5)
LDLC SERPL CALC-MCNC: 29 MG/DL (ref ?–100)
LYMPHOCYTES # BLD AUTO: 2.8 10^3/UL (ref 1.5–5)
LYMPHOCYTES NFR BLD AUTO: 32.5 % (ref 24–44)
MCH RBC QN AUTO: 29.2 PG (ref 27–33)
MCHC RBC AUTO-ENTMCNC: 32 G/DL (ref 32–36.5)
MCV RBC AUTO: 91.1 FL (ref 80–96)
MONOCYTES # BLD AUTO: 0.7 10^3/UL (ref 0–0.8)
MONOCYTES NFR BLD AUTO: 7.9 % (ref 2–8)
NEUTROPHILS # BLD AUTO: 4.9 10^3/UL (ref 1.5–8.5)
NEUTROPHILS NFR BLD AUTO: 57.7 % (ref 36–66)
NONHDLC SERPL-MCNC: 76 MG/DL
PLATELET # BLD AUTO: 280 10^3/UL (ref 150–450)
POTASSIUM SERPL-SCNC: 4.2 MEQ/L (ref 3.5–5.1)
PROT SERPL-MCNC: 6.8 GM/DL (ref 6.4–8.2)
RBC # BLD AUTO: 4.25 10^6/UL (ref 4.3–6.1)
SODIUM SERPL-SCNC: 140 MEQ/L (ref 136–145)
TRIGL SERPL-MCNC: 235 MG/DL (ref ?–150)
WBC # BLD AUTO: 8.5 10^3/UL (ref 4–10)

## 2021-06-14 PROCEDURE — 83036 HEMOGLOBIN GLYCOSYLATED A1C: CPT

## 2021-06-14 PROCEDURE — 80061 LIPID PANEL: CPT

## 2021-06-14 PROCEDURE — 85025 COMPLETE CBC W/AUTO DIFF WBC: CPT

## 2021-06-14 PROCEDURE — 36415 COLL VENOUS BLD VENIPUNCTURE: CPT

## 2021-06-14 PROCEDURE — 80053 COMPREHEN METABOLIC PANEL: CPT

## 2022-04-28 ENCOUNTER — HOSPITAL ENCOUNTER (OUTPATIENT)
Dept: HOSPITAL 53 - M PLALAB | Age: 87
End: 2022-04-28
Attending: UROLOGY
Payer: MEDICARE

## 2022-04-28 DIAGNOSIS — Z85.51: Primary | ICD-10-CM

## 2022-04-28 LAB
ALBUMIN SERPL BCG-MCNC: 3.5 GM/DL (ref 3.2–5.2)
ALT SERPL W P-5'-P-CCNC: 29 U/L (ref 12–78)
BILIRUB SERPL-MCNC: 0.5 MG/DL (ref 0.2–1)
BUN SERPL-MCNC: 16 MG/DL (ref 7–18)
CALCIUM SERPL-MCNC: 9 MG/DL (ref 8.8–10.2)
CHLORIDE SERPL-SCNC: 102 MEQ/L (ref 98–107)
CO2 SERPL-SCNC: 31 MEQ/L (ref 21–32)
CREAT SERPL-MCNC: 1.54 MG/DL (ref 0.7–1.3)
GFR SERPL CREATININE-BSD FRML MDRD: 45.7 ML/MIN/{1.73_M2} (ref 35–?)
GLUCOSE SERPL-MCNC: 405 MG/DL (ref 70–100)
HCT VFR BLD AUTO: 40 % (ref 42–52)
HGB BLD-MCNC: 13.4 G/DL (ref 13.5–17.5)
MCH RBC QN AUTO: 29.8 PG (ref 27–33)
MCHC RBC AUTO-ENTMCNC: 33.5 G/DL (ref 32–36.5)
MCV RBC AUTO: 89.1 FL (ref 80–96)
PLATELET # BLD AUTO: 313 10^3/UL (ref 150–450)
POTASSIUM SERPL-SCNC: 4.5 MEQ/L (ref 3.5–5.1)
PROT SERPL-MCNC: 6.7 GM/DL (ref 6.4–8.2)
RBC # BLD AUTO: 4.49 10^6/UL (ref 4.3–6.1)
SODIUM SERPL-SCNC: 137 MEQ/L (ref 136–145)
WBC # BLD AUTO: 7.7 10^3/UL (ref 4–10)

## 2022-06-20 ENCOUNTER — HOSPITAL ENCOUNTER (OUTPATIENT)
Dept: HOSPITAL 53 - M PLALAB | Age: 87
End: 2022-06-20
Attending: INTERNAL MEDICINE
Payer: MEDICARE

## 2022-06-20 DIAGNOSIS — E11.9: ICD-10-CM

## 2022-06-20 DIAGNOSIS — Z85.51: Primary | ICD-10-CM

## 2022-06-20 DIAGNOSIS — E78.5: ICD-10-CM

## 2022-06-20 LAB
ALBUMIN SERPL BCG-MCNC: 3.7 GM/DL (ref 3.2–5.2)
ALT SERPL W P-5'-P-CCNC: 21 U/L (ref 12–78)
APPEARANCE UR: (no result)
BACTERIA UR QL AUTO: NEGATIVE
BASOPHILS # BLD AUTO: 0.1 10^3/UL (ref 0–0.2)
BASOPHILS NFR BLD AUTO: 0.7 % (ref 0–1)
BILIRUB SERPL-MCNC: 0.5 MG/DL (ref 0.2–1)
BILIRUB UR QL STRIP.AUTO: NEGATIVE
BUN SERPL-MCNC: 23 MG/DL (ref 7–18)
CALCIUM SERPL-MCNC: 9.6 MG/DL (ref 8.8–10.2)
CHLORIDE SERPL-SCNC: 109 MEQ/L (ref 98–107)
CHOLEST SERPL-MCNC: 120 MG/DL (ref ?–200)
CHOLEST/HDLC SERPL: 4.29 {RATIO} (ref ?–5)
CO2 SERPL-SCNC: 24 MEQ/L (ref 21–32)
CREAT SERPL-MCNC: 1.55 MG/DL (ref 0.7–1.3)
CREAT UR-MCNC: 91.1 MG/DL
EOSINOPHIL # BLD AUTO: 0.1 10^3/UL (ref 0–0.5)
EOSINOPHIL NFR BLD AUTO: 1 % (ref 0–3)
EST. AVERAGE GLUCOSE BLD GHB EST-MCNC: 260 MG/DL (ref 60–110)
GFR SERPL CREATININE-BSD FRML MDRD: 45.4 ML/MIN/{1.73_M2} (ref 35–?)
GLUCOSE SERPL-MCNC: 338 MG/DL (ref 70–100)
GLUCOSE UR QL STRIP.AUTO: (no result) MG/DL
HCT VFR BLD AUTO: 42.4 % (ref 42–52)
HDLC SERPL-MCNC: 28 MG/DL (ref 40–?)
HGB BLD-MCNC: 13.7 G/DL (ref 13.5–17.5)
HGB UR QL STRIP.AUTO: (no result)
KETONES UR QL STRIP.AUTO: NEGATIVE MG/DL
LDLC SERPL CALC-MCNC: 63 MG/DL (ref ?–100)
LEUKOCYTE ESTERASE UR QL STRIP.AUTO: (no result)
LYMPHOCYTES # BLD AUTO: 2.3 10^3/UL (ref 1.5–5)
LYMPHOCYTES NFR BLD AUTO: 29.6 % (ref 24–44)
MCH RBC QN AUTO: 29.5 PG (ref 27–33)
MCHC RBC AUTO-ENTMCNC: 32.3 G/DL (ref 32–36.5)
MCV RBC AUTO: 91.2 FL (ref 80–96)
MICROALBUMIN UR-MCNC: 40 MG/L
MICROALBUMIN/CREAT UR: 43.9 MCG/MG (ref 0–30)
MONOCYTES # BLD AUTO: 0.6 10^3/UL (ref 0–0.8)
MONOCYTES NFR BLD AUTO: 8.2 % (ref 2–8)
MUCOUS THREADS URNS QL MICRO: (no result)
NEUTROPHILS # BLD AUTO: 4.6 10^3/UL (ref 1.5–8.5)
NEUTROPHILS NFR BLD AUTO: 59.7 % (ref 36–66)
NITRITE UR QL STRIP.AUTO: NEGATIVE
NONHDLC SERPL-MCNC: 92 MG/DL
PH UR STRIP.AUTO: 8 UNITS (ref 5–9)
PLATELET # BLD AUTO: 243 10^3/UL (ref 150–450)
POTASSIUM SERPL-SCNC: 4.6 MEQ/L (ref 3.5–5.1)
PROT SERPL-MCNC: 6.7 GM/DL (ref 6.4–8.2)
PROT UR QL STRIP.AUTO: NEGATIVE MG/DL
RBC # BLD AUTO: 4.65 10^6/UL (ref 4.3–6.1)
RBC # UR AUTO: 2 /HPF (ref 0–3)
SODIUM SERPL-SCNC: 141 MEQ/L (ref 136–145)
SP GR UR STRIP.AUTO: 1.01 (ref 1–1.03)
SQUAMOUS #/AREA URNS AUTO: 0 /HPF (ref 0–6)
TRIGL SERPL-MCNC: 147 MG/DL (ref ?–150)
UROBILINOGEN UR QL STRIP.AUTO: 0.2 MG/DL (ref 0–2)
WBC # BLD AUTO: 7.7 10^3/UL (ref 4–10)
WBC #/AREA URNS AUTO: 7 /HPF (ref 0–3)

## 2023-04-28 ENCOUNTER — HOSPITAL ENCOUNTER (OUTPATIENT)
Dept: HOSPITAL 53 - M SMT | Age: 88
End: 2023-04-28
Attending: UROLOGY
Payer: MEDICARE

## 2023-04-28 DIAGNOSIS — Z85.51: Primary | ICD-10-CM

## 2023-05-17 ENCOUNTER — HOSPITAL ENCOUNTER (OUTPATIENT)
Dept: HOSPITAL 53 - M PLALAB | Age: 88
End: 2023-05-17
Attending: UROLOGY
Payer: MEDICARE

## 2023-05-17 DIAGNOSIS — Z85.51: Primary | ICD-10-CM

## 2023-05-17 LAB
BUN SERPL-MCNC: 18 MG/DL (ref 9–23)
CALCIUM SERPL-MCNC: 8.5 MG/DL (ref 8.3–10.6)
CHLORIDE SERPL-SCNC: 111 MMOL/L (ref 98–107)
CO2 SERPL-SCNC: 28 MMOL/L (ref 20–31)
CREAT SERPL-MCNC: 1.29 MG/DL (ref 0.7–1.3)
GFR SERPL CREATININE-BSD FRML MDRD: 56 ML/MIN/{1.73_M2} (ref 35–?)
GLUCOSE SERPL-MCNC: 172 MG/DL (ref 74–106)
HCT VFR BLD AUTO: 40.8 % (ref 42–52)
HGB BLD-MCNC: 13.3 G/DL (ref 13.5–17.5)
MCH RBC QN AUTO: 29.8 PG (ref 27–33)
MCHC RBC AUTO-ENTMCNC: 32.6 G/DL (ref 32–36.5)
MCV RBC AUTO: 91.3 FL (ref 80–96)
PLATELET # BLD AUTO: 299 10^3/UL (ref 150–450)
POTASSIUM SERPL-SCNC: 4.6 MMOL/L (ref 3.5–5.1)
RBC # BLD AUTO: 4.47 10^6/UL (ref 4.3–6.1)
SODIUM SERPL-SCNC: 144 MMOL/L (ref 136–145)
WBC # BLD AUTO: 7.7 10^3/UL (ref 4–10)

## 2023-05-19 ENCOUNTER — HOSPITAL ENCOUNTER (OUTPATIENT)
Dept: HOSPITAL 53 - M PLALAB | Age: 88
End: 2023-05-19
Payer: MEDICARE

## 2023-05-19 DIAGNOSIS — E11.65: Primary | ICD-10-CM

## 2023-05-19 LAB
ALBUMIN SERPL BCG-MCNC: 3.4 G/DL (ref 3.2–5.2)
ALP SERPL-CCNC: 121 U/L (ref 46–116)
ALT SERPL W P-5'-P-CCNC: 20 U/L (ref 7–40)
AST SERPL-CCNC: 14 U/L (ref ?–34)
BILIRUB SERPL-MCNC: 0.5 MG/DL (ref 0.3–1.2)
BUN SERPL-MCNC: 16 MG/DL (ref 9–23)
CALCIUM SERPL-MCNC: 8.4 MG/DL (ref 8.3–10.6)
CHLORIDE SERPL-SCNC: 109 MMOL/L (ref 98–107)
CHOLEST SERPL-MCNC: 97 MG/DL (ref ?–200)
CHOLEST/HDLC SERPL: 3.32 {RATIO} (ref ?–5)
CO2 SERPL-SCNC: 26 MMOL/L (ref 20–31)
CREAT SERPL-MCNC: 1.23 MG/DL (ref 0.7–1.3)
CRP SERPL-MCNC: < 0.4 MG/DL (ref ?–1)
GFR SERPL CREATININE-BSD FRML MDRD: 59.1 ML/MIN/{1.73_M2} (ref 35–?)
GLUCOSE SERPL-MCNC: 220 MG/DL (ref 74–106)
HCT VFR BLD AUTO: 41.2 % (ref 42–52)
HDLC SERPL-MCNC: 29.2 MG/DL (ref 40–?)
HGB BLD-MCNC: 13.2 G/DL (ref 13.5–17.5)
LDLC SERPL CALC-MCNC: 47.8 MG/DL (ref ?–100)
MCH RBC QN AUTO: 29.9 PG (ref 27–33)
MCHC RBC AUTO-ENTMCNC: 32 G/DL (ref 32–36.5)
MCV RBC AUTO: 93.2 FL (ref 80–96)
NONHDLC SERPL-MCNC: 67.8 MG/DL
PLATELET # BLD AUTO: 270 10^3/UL (ref 150–450)
POTASSIUM SERPL-SCNC: 4.5 MMOL/L (ref 3.5–5.1)
PROT SERPL-MCNC: 6.1 G/DL (ref 5.7–8.2)
RBC # BLD AUTO: 4.42 10^6/UL (ref 4.3–6.1)
SODIUM SERPL-SCNC: 142 MMOL/L (ref 136–145)
TRIGL SERPL-MCNC: 100 MG/DL (ref ?–150)
WBC # BLD AUTO: 8 10^3/UL (ref 4–10)

## 2024-02-05 ENCOUNTER — HOSPITAL ENCOUNTER (EMERGENCY)
Dept: HOSPITAL 53 - M ED | Age: 89
Discharge: HOME | End: 2024-02-05
Payer: MEDICARE

## 2024-02-05 VITALS — TEMPERATURE: 98.4 F | SYSTOLIC BLOOD PRESSURE: 134 MMHG | OXYGEN SATURATION: 99 % | DIASTOLIC BLOOD PRESSURE: 70 MMHG

## 2024-02-05 VITALS — OXYGEN SATURATION: 96 %

## 2024-02-05 DIAGNOSIS — J44.9: ICD-10-CM

## 2024-02-05 DIAGNOSIS — Z79.899: ICD-10-CM

## 2024-02-05 DIAGNOSIS — Z79.82: ICD-10-CM

## 2024-02-05 DIAGNOSIS — E11.9: ICD-10-CM

## 2024-02-05 DIAGNOSIS — J12.1: Primary | ICD-10-CM

## 2024-02-05 LAB — RSV RNA NPH QL NAA+PROBE: POSITIVE

## 2024-02-20 ENCOUNTER — HOSPITAL ENCOUNTER (INPATIENT)
Dept: HOSPITAL 53 - M ED | Age: 89
LOS: 10 days | Discharge: HOME | DRG: 871 | End: 2024-03-01
Attending: STUDENT IN AN ORGANIZED HEALTH CARE EDUCATION/TRAINING PROGRAM | Admitting: STUDENT IN AN ORGANIZED HEALTH CARE EDUCATION/TRAINING PROGRAM
Payer: MEDICARE

## 2024-02-20 VITALS — DIASTOLIC BLOOD PRESSURE: 59 MMHG | SYSTOLIC BLOOD PRESSURE: 120 MMHG | OXYGEN SATURATION: 99 %

## 2024-02-20 VITALS — OXYGEN SATURATION: 99 % | DIASTOLIC BLOOD PRESSURE: 52 MMHG | TEMPERATURE: 97.2 F | SYSTOLIC BLOOD PRESSURE: 99 MMHG

## 2024-02-20 VITALS — DIASTOLIC BLOOD PRESSURE: 60 MMHG | SYSTOLIC BLOOD PRESSURE: 111 MMHG | OXYGEN SATURATION: 100 %

## 2024-02-20 VITALS — DIASTOLIC BLOOD PRESSURE: 92 MMHG | OXYGEN SATURATION: 100 % | SYSTOLIC BLOOD PRESSURE: 117 MMHG

## 2024-02-20 VITALS — OXYGEN SATURATION: 100 % | SYSTOLIC BLOOD PRESSURE: 98 MMHG | DIASTOLIC BLOOD PRESSURE: 52 MMHG

## 2024-02-20 VITALS — OXYGEN SATURATION: 98 % | SYSTOLIC BLOOD PRESSURE: 105 MMHG | DIASTOLIC BLOOD PRESSURE: 59 MMHG

## 2024-02-20 VITALS — DIASTOLIC BLOOD PRESSURE: 54 MMHG | OXYGEN SATURATION: 100 % | TEMPERATURE: 97.2 F | SYSTOLIC BLOOD PRESSURE: 110 MMHG

## 2024-02-20 VITALS — DIASTOLIC BLOOD PRESSURE: 58 MMHG | OXYGEN SATURATION: 98 % | SYSTOLIC BLOOD PRESSURE: 103 MMHG

## 2024-02-20 VITALS — OXYGEN SATURATION: 99 % | SYSTOLIC BLOOD PRESSURE: 99 MMHG | DIASTOLIC BLOOD PRESSURE: 56 MMHG

## 2024-02-20 VITALS — SYSTOLIC BLOOD PRESSURE: 100 MMHG | OXYGEN SATURATION: 100 % | DIASTOLIC BLOOD PRESSURE: 54 MMHG

## 2024-02-20 VITALS — SYSTOLIC BLOOD PRESSURE: 97 MMHG | OXYGEN SATURATION: 100 % | DIASTOLIC BLOOD PRESSURE: 50 MMHG | TEMPERATURE: 97 F

## 2024-02-20 VITALS — OXYGEN SATURATION: 99 % | SYSTOLIC BLOOD PRESSURE: 101 MMHG | DIASTOLIC BLOOD PRESSURE: 50 MMHG

## 2024-02-20 VITALS — OXYGEN SATURATION: 100 % | DIASTOLIC BLOOD PRESSURE: 51 MMHG | SYSTOLIC BLOOD PRESSURE: 98 MMHG

## 2024-02-20 VITALS — BODY MASS INDEX: 24.93 KG/M2 | HEIGHT: 70 IN | WEIGHT: 174.17 LBS

## 2024-02-20 VITALS — OXYGEN SATURATION: 99 %

## 2024-02-20 VITALS — DIASTOLIC BLOOD PRESSURE: 57 MMHG | SYSTOLIC BLOOD PRESSURE: 113 MMHG | OXYGEN SATURATION: 100 %

## 2024-02-20 VITALS — SYSTOLIC BLOOD PRESSURE: 101 MMHG | DIASTOLIC BLOOD PRESSURE: 51 MMHG | OXYGEN SATURATION: 100 %

## 2024-02-20 VITALS — TEMPERATURE: 97.5 F | DIASTOLIC BLOOD PRESSURE: 53 MMHG | OXYGEN SATURATION: 100 % | SYSTOLIC BLOOD PRESSURE: 101 MMHG

## 2024-02-20 DIAGNOSIS — Z98.42: ICD-10-CM

## 2024-02-20 DIAGNOSIS — Z11.52: ICD-10-CM

## 2024-02-20 DIAGNOSIS — Z85.51: ICD-10-CM

## 2024-02-20 DIAGNOSIS — I50.32: ICD-10-CM

## 2024-02-20 DIAGNOSIS — M62.82: ICD-10-CM

## 2024-02-20 DIAGNOSIS — Z79.82: ICD-10-CM

## 2024-02-20 DIAGNOSIS — N18.30: ICD-10-CM

## 2024-02-20 DIAGNOSIS — R68.0: ICD-10-CM

## 2024-02-20 DIAGNOSIS — N39.0: ICD-10-CM

## 2024-02-20 DIAGNOSIS — B97.4: ICD-10-CM

## 2024-02-20 DIAGNOSIS — G93.41: ICD-10-CM

## 2024-02-20 DIAGNOSIS — Z90.49: ICD-10-CM

## 2024-02-20 DIAGNOSIS — N17.9: ICD-10-CM

## 2024-02-20 DIAGNOSIS — D64.9: ICD-10-CM

## 2024-02-20 DIAGNOSIS — E11.22: ICD-10-CM

## 2024-02-20 DIAGNOSIS — E87.0: ICD-10-CM

## 2024-02-20 DIAGNOSIS — E11.10: ICD-10-CM

## 2024-02-20 DIAGNOSIS — A41.9: Primary | ICD-10-CM

## 2024-02-20 DIAGNOSIS — Z88.2: ICD-10-CM

## 2024-02-20 DIAGNOSIS — R57.1: ICD-10-CM

## 2024-02-20 DIAGNOSIS — Z98.41: ICD-10-CM

## 2024-02-20 DIAGNOSIS — Z79.4: ICD-10-CM

## 2024-02-20 DIAGNOSIS — R65.21: ICD-10-CM

## 2024-02-20 DIAGNOSIS — E11.65: ICD-10-CM

## 2024-02-20 DIAGNOSIS — E78.5: ICD-10-CM

## 2024-02-20 LAB
ALBUMIN SERPL BCG-MCNC: 3.4 G/DL (ref 3.2–5.2)
ALP SERPL-CCNC: 147 U/L (ref 46–116)
ALT SERPL W P-5'-P-CCNC: 26 U/L (ref 7–40)
AST SERPL-CCNC: 14 U/L (ref ?–34)
B-OH-BUTYR SERPL-MCNC: 4.09 MMOL/L (ref 0.02–0.27)
BASE EXCESS BLDA CALC-SCNC: -21.4 MMOL/L (ref -2–2)
BASOPHILS # BLD AUTO: 0.1 10^3/UL (ref 0–0.2)
BASOPHILS NFR BLD AUTO: 0.5 % (ref 0–1)
BILIRUB CONJ SERPL-MCNC: 0.3 MG/DL (ref ?–0.4)
BILIRUB SERPL-MCNC: 0.4 MG/DL (ref 0.3–1.2)
BUN SERPL-MCNC: 56 MG/DL (ref 9–23)
BUN SERPL-MCNC: 60 MG/DL (ref 9–23)
CALCIUM SERPL-MCNC: 8.7 MG/DL (ref 8.3–10.6)
CALCIUM SERPL-MCNC: 9.7 MG/DL (ref 8.3–10.6)
CHLORIDE SERPL-SCNC: 100 MMOL/L (ref 98–107)
CHLORIDE SERPL-SCNC: 115 MMOL/L (ref 98–107)
CK SERPL-CCNC: 1959 U/L (ref 46–171)
CO2 BLDA CALC-SCNC: 10.8 MMOL/L (ref 23–31)
CO2 SERPL-SCNC: 14 MMOL/L (ref 20–31)
CO2 SERPL-SCNC: 16 MMOL/L (ref 20–31)
CREAT SERPL-MCNC: 3.08 MG/DL (ref 0.7–1.3)
CREAT SERPL-MCNC: 3.76 MG/DL (ref 0.7–1.3)
CRP SERPL-MCNC: 2.7 MG/DL (ref ?–1)
EOSINOPHIL # BLD AUTO: 0 10^3/UL (ref 0–0.5)
EOSINOPHIL NFR BLD AUTO: 0.1 % (ref 0–3)
EST. AVERAGE GLUCOSE BLD GHB EST-MCNC: 306 MG/DL (ref 60–110)
GFR SERPL CREATININE-BSD FRML MDRD: 16.3 ML/MIN/{1.73_M2} (ref 35–?)
GFR SERPL CREATININE-BSD FRML MDRD: 20.5 ML/MIN/{1.73_M2} (ref 35–?)
GLUCOSE SERPL-MCNC: 1229 MG/DL (ref 74–106)
GLUCOSE SERPL-MCNC: 669 MG/DL (ref 74–106)
HCO3 BLDA-SCNC: 9.6 MMOL/L (ref 22–26)
HCO3 STD BLDA-SCNC: 9.5 MMOL/L. (ref 22–26)
HCT VFR BLD AUTO: 54.5 % (ref 42–52)
HGB BLD-MCNC: 15.6 G/DL (ref 13.5–17.5)
LYMPHOCYTES # BLD AUTO: 1.7 10^3/UL (ref 1.5–5)
LYMPHOCYTES NFR BLD AUTO: 7.6 % (ref 24–44)
MAGNESIUM SERPL-MCNC: 2.6 MG/DL (ref 1.8–2.4)
MCH RBC QN AUTO: 29.8 PG (ref 27–33)
MCHC RBC AUTO-ENTMCNC: 28.6 G/DL (ref 32–36.5)
MCV RBC AUTO: 104.2 FL (ref 80–96)
MONOCYTES # BLD AUTO: 2 10^3/UL (ref 0–0.8)
MONOCYTES NFR BLD AUTO: 8.8 % (ref 2–8)
NEUTROPHILS # BLD AUTO: 17.9 10^3/UL (ref 1.5–8.5)
NEUTROPHILS NFR BLD AUTO: 79.4 % (ref 36–66)
PCO2 BLDA: 40.4 MMHG (ref 35–45)
PH BLDA: 6.99 UNITS (ref 7.35–7.45)
PHOSPHATE SERPL-MCNC: 2.6 MG/DL (ref 2.4–5.1)
PLATELET # BLD AUTO: 419 10^3/UL (ref 150–450)
PO2 BLDA: 95.6 MMHG (ref 75–100)
POTASSIUM SERPL-SCNC: 3.4 MMOL/L (ref 3.5–5.1)
POTASSIUM SERPL-SCNC: 5.2 MMOL/L (ref 3.5–5.1)
PROT SERPL-MCNC: 6.6 G/DL (ref 5.7–8.2)
RBC # BLD AUTO: 5.23 10^6/UL (ref 4.3–6.1)
SAO2 % BLDA: 94.9 % (ref 95–99)
SODIUM SERPL-SCNC: 138 MMOL/L (ref 136–145)
SODIUM SERPL-SCNC: 150 MMOL/L (ref 136–145)
T4 FREE SERPL-MCNC: 1.07 NG/DL (ref 0.89–1.76)
TSH SERPL DL<=0.005 MIU/L-ACNC: 1.56 UIU/ML (ref 0.55–4.78)
WBC # BLD AUTO: 22.5 10^3/UL (ref 4–10)

## 2024-02-20 RX ADMIN — SODIUM CHLORIDE ONE MLS/HR: 9 INJECTION, SOLUTION INTRAVENOUS at 16:19

## 2024-02-20 RX ADMIN — INSULIN HUMAN SCH MLS/HR: 1 INJECTION, SOLUTION INTRAVENOUS at 16:37

## 2024-02-20 RX ADMIN — SODIUM CHLORIDE SCH MLS/HR: 4.5 INJECTION, SOLUTION INTRAVENOUS at 21:52

## 2024-02-20 RX ADMIN — INSULIN HUMAN ONE UNITS: 100 INJECTION, SOLUTION PARENTERAL at 16:38

## 2024-02-20 RX ADMIN — SODIUM CHLORIDE, POTASSIUM CHLORIDE, SODIUM LACTATE AND CALCIUM CHLORIDE ONE MLS/HR: 600; 310; 30; 20 INJECTION, SOLUTION INTRAVENOUS at 20:47

## 2024-02-20 RX ADMIN — Medication SCH MLS/HR: at 17:06

## 2024-02-20 RX ADMIN — Medication SCH: at 20:10

## 2024-02-20 RX ADMIN — POTASSIUM CHLORIDE SCH MLS/HR: 7.46 INJECTION, SOLUTION INTRAVENOUS at 22:28

## 2024-02-20 RX ADMIN — CEFTRIAXONE ONE MLS/HR: 2 INJECTION, POWDER, FOR SOLUTION INTRAMUSCULAR; INTRAVENOUS at 16:19

## 2024-02-20 RX ADMIN — Medication SCH MLS/HR: at 18:53

## 2024-02-20 RX ADMIN — SODIUM CHLORIDE ONE MLS/HR: 9 INJECTION, SOLUTION INTRAVENOUS at 17:51

## 2024-02-21 VITALS — OXYGEN SATURATION: 100 % | DIASTOLIC BLOOD PRESSURE: 50 MMHG | SYSTOLIC BLOOD PRESSURE: 92 MMHG

## 2024-02-21 VITALS — SYSTOLIC BLOOD PRESSURE: 101 MMHG | OXYGEN SATURATION: 100 % | DIASTOLIC BLOOD PRESSURE: 55 MMHG

## 2024-02-21 VITALS — DIASTOLIC BLOOD PRESSURE: 52 MMHG | OXYGEN SATURATION: 100 % | TEMPERATURE: 97.7 F | SYSTOLIC BLOOD PRESSURE: 100 MMHG

## 2024-02-21 VITALS — DIASTOLIC BLOOD PRESSURE: 55 MMHG | SYSTOLIC BLOOD PRESSURE: 94 MMHG | TEMPERATURE: 98.4 F | OXYGEN SATURATION: 100 %

## 2024-02-21 VITALS — DIASTOLIC BLOOD PRESSURE: 48 MMHG | TEMPERATURE: 98.1 F | OXYGEN SATURATION: 98 % | SYSTOLIC BLOOD PRESSURE: 94 MMHG

## 2024-02-21 VITALS — SYSTOLIC BLOOD PRESSURE: 106 MMHG | OXYGEN SATURATION: 100 % | TEMPERATURE: 97.5 F | DIASTOLIC BLOOD PRESSURE: 54 MMHG

## 2024-02-21 VITALS — OXYGEN SATURATION: 100 % | DIASTOLIC BLOOD PRESSURE: 51 MMHG | SYSTOLIC BLOOD PRESSURE: 101 MMHG

## 2024-02-21 VITALS — SYSTOLIC BLOOD PRESSURE: 100 MMHG | OXYGEN SATURATION: 98 % | DIASTOLIC BLOOD PRESSURE: 51 MMHG

## 2024-02-21 VITALS — OXYGEN SATURATION: 100 % | DIASTOLIC BLOOD PRESSURE: 59 MMHG | SYSTOLIC BLOOD PRESSURE: 102 MMHG

## 2024-02-21 VITALS — OXYGEN SATURATION: 100 % | DIASTOLIC BLOOD PRESSURE: 52 MMHG | SYSTOLIC BLOOD PRESSURE: 86 MMHG

## 2024-02-21 VITALS — DIASTOLIC BLOOD PRESSURE: 58 MMHG | OXYGEN SATURATION: 100 % | SYSTOLIC BLOOD PRESSURE: 93 MMHG

## 2024-02-21 VITALS — DIASTOLIC BLOOD PRESSURE: 58 MMHG | OXYGEN SATURATION: 99 % | TEMPERATURE: 99 F | SYSTOLIC BLOOD PRESSURE: 116 MMHG

## 2024-02-21 VITALS — OXYGEN SATURATION: 100 % | DIASTOLIC BLOOD PRESSURE: 57 MMHG | SYSTOLIC BLOOD PRESSURE: 113 MMHG | TEMPERATURE: 99.1 F

## 2024-02-21 VITALS — SYSTOLIC BLOOD PRESSURE: 110 MMHG | DIASTOLIC BLOOD PRESSURE: 57 MMHG | OXYGEN SATURATION: 100 %

## 2024-02-21 VITALS — DIASTOLIC BLOOD PRESSURE: 52 MMHG | OXYGEN SATURATION: 100 % | SYSTOLIC BLOOD PRESSURE: 101 MMHG

## 2024-02-21 VITALS — OXYGEN SATURATION: 100 % | SYSTOLIC BLOOD PRESSURE: 97 MMHG | DIASTOLIC BLOOD PRESSURE: 55 MMHG

## 2024-02-21 VITALS — DIASTOLIC BLOOD PRESSURE: 56 MMHG | SYSTOLIC BLOOD PRESSURE: 103 MMHG | OXYGEN SATURATION: 100 %

## 2024-02-21 VITALS — DIASTOLIC BLOOD PRESSURE: 55 MMHG | SYSTOLIC BLOOD PRESSURE: 114 MMHG | OXYGEN SATURATION: 100 %

## 2024-02-21 VITALS — DIASTOLIC BLOOD PRESSURE: 55 MMHG | OXYGEN SATURATION: 100 % | SYSTOLIC BLOOD PRESSURE: 104 MMHG

## 2024-02-21 VITALS — DIASTOLIC BLOOD PRESSURE: 62 MMHG | OXYGEN SATURATION: 100 % | SYSTOLIC BLOOD PRESSURE: 96 MMHG

## 2024-02-21 VITALS — OXYGEN SATURATION: 98 % | SYSTOLIC BLOOD PRESSURE: 112 MMHG | DIASTOLIC BLOOD PRESSURE: 57 MMHG

## 2024-02-21 VITALS — OXYGEN SATURATION: 100 % | SYSTOLIC BLOOD PRESSURE: 99 MMHG | DIASTOLIC BLOOD PRESSURE: 58 MMHG

## 2024-02-21 VITALS — OXYGEN SATURATION: 97 % | TEMPERATURE: 98.8 F | DIASTOLIC BLOOD PRESSURE: 51 MMHG | SYSTOLIC BLOOD PRESSURE: 102 MMHG

## 2024-02-21 VITALS — DIASTOLIC BLOOD PRESSURE: 57 MMHG | TEMPERATURE: 98.1 F | SYSTOLIC BLOOD PRESSURE: 104 MMHG | OXYGEN SATURATION: 99 %

## 2024-02-21 VITALS — DIASTOLIC BLOOD PRESSURE: 53 MMHG | TEMPERATURE: 98.2 F | OXYGEN SATURATION: 99 % | SYSTOLIC BLOOD PRESSURE: 106 MMHG

## 2024-02-21 VITALS — DIASTOLIC BLOOD PRESSURE: 55 MMHG | OXYGEN SATURATION: 100 % | TEMPERATURE: 97.3 F | SYSTOLIC BLOOD PRESSURE: 103 MMHG

## 2024-02-21 VITALS — SYSTOLIC BLOOD PRESSURE: 101 MMHG | OXYGEN SATURATION: 99 % | TEMPERATURE: 97.3 F | DIASTOLIC BLOOD PRESSURE: 55 MMHG

## 2024-02-21 VITALS — DIASTOLIC BLOOD PRESSURE: 52 MMHG | SYSTOLIC BLOOD PRESSURE: 104 MMHG | OXYGEN SATURATION: 100 %

## 2024-02-21 VITALS — DIASTOLIC BLOOD PRESSURE: 53 MMHG | SYSTOLIC BLOOD PRESSURE: 115 MMHG | TEMPERATURE: 98.8 F | OXYGEN SATURATION: 100 %

## 2024-02-21 VITALS — OXYGEN SATURATION: 98 % | SYSTOLIC BLOOD PRESSURE: 117 MMHG | DIASTOLIC BLOOD PRESSURE: 54 MMHG | TEMPERATURE: 98.7 F

## 2024-02-21 VITALS — DIASTOLIC BLOOD PRESSURE: 53 MMHG | OXYGEN SATURATION: 100 % | SYSTOLIC BLOOD PRESSURE: 104 MMHG

## 2024-02-21 VITALS — OXYGEN SATURATION: 100 % | DIASTOLIC BLOOD PRESSURE: 55 MMHG | SYSTOLIC BLOOD PRESSURE: 92 MMHG

## 2024-02-21 VITALS — DIASTOLIC BLOOD PRESSURE: 59 MMHG | SYSTOLIC BLOOD PRESSURE: 105 MMHG | OXYGEN SATURATION: 99 %

## 2024-02-21 VITALS — OXYGEN SATURATION: 100 % | SYSTOLIC BLOOD PRESSURE: 109 MMHG | DIASTOLIC BLOOD PRESSURE: 54 MMHG

## 2024-02-21 LAB
ALBUMIN SERPL BCG-MCNC: 2.4 G/DL (ref 3.2–5.2)
ALBUMIN SERPL BCG-MCNC: 2.4 G/DL (ref 3.2–5.2)
ALP SERPL-CCNC: 106 U/L (ref 46–116)
ALP SERPL-CCNC: 106 U/L (ref 46–116)
ALT SERPL W P-5'-P-CCNC: 37 U/L (ref 7–40)
ALT SERPL W P-5'-P-CCNC: 39 U/L (ref 7–40)
AST SERPL-CCNC: 72 U/L (ref ?–34)
AST SERPL-CCNC: 74 U/L (ref ?–34)
BASE EXCESS BLDV CALC-SCNC: -9.5 MMOL/L (ref -2–2)
BASOPHILS # BLD AUTO: 0 10^3/UL (ref 0–0.2)
BASOPHILS NFR BLD AUTO: 0.2 % (ref 0–1)
BILIRUB CONJ SERPL-MCNC: 0.2 MG/DL (ref ?–0.4)
BILIRUB SERPL-MCNC: 0.3 MG/DL (ref 0.3–1.2)
BILIRUB SERPL-MCNC: 0.3 MG/DL (ref 0.3–1.2)
BUN SERPL-MCNC: 38 MG/DL (ref 9–23)
BUN SERPL-MCNC: 43 MG/DL (ref 9–23)
BUN SERPL-MCNC: 51 MG/DL (ref 9–23)
BUN SERPL-MCNC: 52 MG/DL (ref 9–23)
CALCIUM SERPL-MCNC: 8.1 MG/DL (ref 8.3–10.6)
CALCIUM SERPL-MCNC: 8.2 MG/DL (ref 8.3–10.6)
CALCIUM SERPL-MCNC: 8.3 MG/DL (ref 8.3–10.6)
CALCIUM SERPL-MCNC: 8.3 MG/DL (ref 8.3–10.6)
CHLORIDE SERPL-SCNC: 119 MMOL/L (ref 98–107)
CHLORIDE SERPL-SCNC: 120 MMOL/L (ref 98–107)
CHLORIDE SERPL-SCNC: 121 MMOL/L (ref 98–107)
CHLORIDE SERPL-SCNC: 122 MMOL/L (ref 98–107)
CO2 BLDV CALC-SCNC: 17.5 MMOL/L (ref 24–28)
CO2 SERPL-SCNC: 19 MMOL/L (ref 20–31)
CO2 SERPL-SCNC: 20 MMOL/L (ref 20–31)
CO2 SERPL-SCNC: 20 MMOL/L (ref 20–31)
CO2 SERPL-SCNC: 22 MMOL/L (ref 20–31)
CREAT SERPL-MCNC: 2.17 MG/DL (ref 0.7–1.3)
CREAT SERPL-MCNC: 2.38 MG/DL (ref 0.7–1.3)
CREAT SERPL-MCNC: 2.5 MG/DL (ref 0.7–1.3)
CREAT SERPL-MCNC: 2.67 MG/DL (ref 0.7–1.3)
EOSINOPHIL # BLD AUTO: 0 10^3/UL (ref 0–0.5)
EOSINOPHIL NFR BLD AUTO: 0.2 % (ref 0–3)
GFR SERPL CREATININE-BSD FRML MDRD: 24.2 ML/MIN/{1.73_M2} (ref 35–?)
GFR SERPL CREATININE-BSD FRML MDRD: 26.1 ML/MIN/{1.73_M2} (ref 35–?)
GFR SERPL CREATININE-BSD FRML MDRD: 27.6 ML/MIN/{1.73_M2} (ref 35–?)
GFR SERPL CREATININE-BSD FRML MDRD: 30.7 ML/MIN/{1.73_M2} (ref 35–?)
GLUCOSE SERPL-MCNC: 181 MG/DL (ref 74–106)
GLUCOSE SERPL-MCNC: 205 MG/DL (ref 74–106)
GLUCOSE SERPL-MCNC: 269 MG/DL (ref 74–106)
GLUCOSE SERPL-MCNC: 427 MG/DL (ref 74–106)
HCO3 BLDV-SCNC: 16.4 MMOL/L (ref 23–27)
HCO3 STD BLDV-SCNC: 17 MMOL/L
HCT VFR BLD AUTO: 41.2 % (ref 42–52)
HGB BLD-MCNC: 13.3 G/DL (ref 13.5–17.5)
LYMPHOCYTES # BLD AUTO: 1.6 10^3/UL (ref 1.5–5)
LYMPHOCYTES NFR BLD AUTO: 12.8 % (ref 24–44)
MAGNESIUM SERPL-MCNC: 2.2 MG/DL (ref 1.8–2.4)
MCH RBC QN AUTO: 30.1 PG (ref 27–33)
MCHC RBC AUTO-ENTMCNC: 32.3 G/DL (ref 32–36.5)
MCV RBC AUTO: 93.2 FL (ref 80–96)
MONOCYTES # BLD AUTO: 1.1 10^3/UL (ref 0–0.8)
MONOCYTES NFR BLD AUTO: 8.8 % (ref 2–8)
NEUTROPHILS # BLD AUTO: 9.5 10^3/UL (ref 1.5–8.5)
NEUTROPHILS NFR BLD AUTO: 77.4 % (ref 36–66)
PCO2 BLDV: 36.2 MMHG (ref 38–50)
PH BLDV: 7.27 UNITS (ref 7.33–7.43)
PLATELET # BLD AUTO: 206 10^3/UL (ref 150–450)
PO2 BLDV: 186.9 MMHG (ref 30–50)
POTASSIUM SERPL-SCNC: 3.3 MMOL/L (ref 3.5–5.1)
POTASSIUM SERPL-SCNC: 4.1 MMOL/L (ref 3.5–5.1)
POTASSIUM SERPL-SCNC: 4.5 MMOL/L (ref 3.5–5.1)
POTASSIUM SERPL-SCNC: 4.8 MMOL/L (ref 3.5–5.1)
PROCALCITONIN SERPL-MCNC: 0.79 NG/ML
PROT SERPL-MCNC: 5.1 G/DL (ref 5.7–8.2)
PROT SERPL-MCNC: 5.1 G/DL (ref 5.7–8.2)
RBC # BLD AUTO: 4.42 10^6/UL (ref 4.3–6.1)
SAO2 % BLDV: 99 % (ref 60–80)
SODIUM SERPL-SCNC: 150 MMOL/L (ref 136–145)
SODIUM SERPL-SCNC: 150 MMOL/L (ref 136–145)
SODIUM SERPL-SCNC: 151 MMOL/L (ref 136–145)
SODIUM SERPL-SCNC: 152 MMOL/L (ref 136–145)
WBC # BLD AUTO: 12.2 10^3/UL (ref 4–10)

## 2024-02-21 RX ADMIN — INSULIN DETEMIR SCH UNITS: 100 INJECTION, SOLUTION SUBCUTANEOUS at 05:29

## 2024-02-21 RX ADMIN — SODIUM CHLORIDE SCH MLS/HR: 9 INJECTION, SOLUTION INTRAVENOUS at 11:06

## 2024-02-21 RX ADMIN — CEFTRIAXONE SCH MLS/HR: 2 INJECTION, POWDER, FOR SOLUTION INTRAMUSCULAR; INTRAVENOUS at 15:53

## 2024-02-21 RX ADMIN — INSULIN LISPRO SCH UNITS: 100 INJECTION, SOLUTION INTRAVENOUS; SUBCUTANEOUS at 21:00

## 2024-02-21 RX ADMIN — SODIUM CHLORIDE ONE MLS/HR: 9 INJECTION, SOLUTION INTRAVENOUS at 18:18

## 2024-02-21 RX ADMIN — POTASSIUM CHLORIDE SCH MLS/HR: 7.46 INJECTION, SOLUTION INTRAVENOUS at 02:54

## 2024-02-21 RX ADMIN — SODIUM CHLORIDE SCH UNITS: 4.5 INJECTION, SOLUTION INTRAVENOUS at 05:29

## 2024-02-21 RX ADMIN — INSULIN HUMAN SCH MLS/HR: 1 INJECTION, SOLUTION INTRAVENOUS at 02:54

## 2024-02-21 RX ADMIN — DEXTROSE MONOHYDRATE SCH MG: 50 INJECTION, SOLUTION INTRAVENOUS at 08:08

## 2024-02-21 RX ADMIN — DEXTROSE AND SODIUM CHLORIDE SCH MLS/HR: 5; 450 INJECTION, SOLUTION INTRAVENOUS at 04:25

## 2024-02-21 RX ADMIN — GUAIFENESIN SCH MG: 600 TABLET, EXTENDED RELEASE ORAL at 21:03

## 2024-02-21 RX ADMIN — INSULIN LISPRO SCH UNITS: 100 INJECTION, SOLUTION INTRAVENOUS; SUBCUTANEOUS at 12:00

## 2024-02-22 VITALS — TEMPERATURE: 99.3 F | DIASTOLIC BLOOD PRESSURE: 60 MMHG | OXYGEN SATURATION: 100 % | SYSTOLIC BLOOD PRESSURE: 128 MMHG

## 2024-02-22 VITALS — TEMPERATURE: 99 F | SYSTOLIC BLOOD PRESSURE: 111 MMHG | DIASTOLIC BLOOD PRESSURE: 59 MMHG | OXYGEN SATURATION: 97 %

## 2024-02-22 VITALS — SYSTOLIC BLOOD PRESSURE: 129 MMHG | DIASTOLIC BLOOD PRESSURE: 76 MMHG | TEMPERATURE: 99.2 F | OXYGEN SATURATION: 98 %

## 2024-02-22 VITALS — DIASTOLIC BLOOD PRESSURE: 58 MMHG | TEMPERATURE: 99.3 F | OXYGEN SATURATION: 100 % | SYSTOLIC BLOOD PRESSURE: 114 MMHG

## 2024-02-22 VITALS — TEMPERATURE: 98.5 F | SYSTOLIC BLOOD PRESSURE: 126 MMHG | DIASTOLIC BLOOD PRESSURE: 60 MMHG | OXYGEN SATURATION: 98 %

## 2024-02-22 VITALS — OXYGEN SATURATION: 100 % | DIASTOLIC BLOOD PRESSURE: 63 MMHG | SYSTOLIC BLOOD PRESSURE: 113 MMHG | TEMPERATURE: 99.1 F

## 2024-02-22 LAB
BASOPHILS # BLD AUTO: 0 10^3/UL (ref 0–0.2)
BASOPHILS NFR BLD AUTO: 0.1 % (ref 0–1)
BUN SERPL-MCNC: 34 MG/DL (ref 9–23)
BUN SERPL-MCNC: 43 MG/DL (ref 9–23)
CALCIUM SERPL-MCNC: 7.5 MG/DL (ref 8.3–10.6)
CALCIUM SERPL-MCNC: 7.6 MG/DL (ref 8.3–10.6)
CHLORIDE SERPL-SCNC: 116 MMOL/L (ref 98–107)
CHLORIDE SERPL-SCNC: 121 MMOL/L (ref 98–107)
CO2 SERPL-SCNC: 18 MMOL/L (ref 20–31)
CO2 SERPL-SCNC: 19 MMOL/L (ref 20–31)
CREAT SERPL-MCNC: 1.26 MG/DL (ref 0.7–1.3)
CREAT SERPL-MCNC: 1.6 MG/DL (ref 0.7–1.3)
EOSINOPHIL # BLD AUTO: 0 10^3/UL (ref 0–0.5)
EOSINOPHIL NFR BLD AUTO: 0.2 % (ref 0–3)
GFR SERPL CREATININE-BSD FRML MDRD: 43.6 ML/MIN/{1.73_M2} (ref 35–?)
GFR SERPL CREATININE-BSD FRML MDRD: 57.5 ML/MIN/{1.73_M2} (ref 35–?)
GLUCOSE SERPL-MCNC: 226 MG/DL (ref 74–106)
GLUCOSE SERPL-MCNC: 262 MG/DL (ref 74–106)
HCT VFR BLD AUTO: 36 % (ref 42–52)
HGB BLD-MCNC: 11.4 G/DL (ref 13.5–17.5)
LYMPHOCYTES # BLD AUTO: 1.8 10^3/UL (ref 1.5–5)
LYMPHOCYTES NFR BLD AUTO: 17.9 % (ref 24–44)
MAGNESIUM SERPL-MCNC: 1.8 MG/DL (ref 1.8–2.4)
MAGNESIUM SERPL-MCNC: 1.9 MG/DL (ref 1.8–2.4)
MCH RBC QN AUTO: 29.8 PG (ref 27–33)
MCHC RBC AUTO-ENTMCNC: 31.7 G/DL (ref 32–36.5)
MCV RBC AUTO: 94.2 FL (ref 80–96)
MONOCYTES # BLD AUTO: 0.8 10^3/UL (ref 0–0.8)
MONOCYTES NFR BLD AUTO: 8.5 % (ref 2–8)
NEUTROPHILS # BLD AUTO: 7.2 10^3/UL (ref 1.5–8.5)
NEUTROPHILS NFR BLD AUTO: 72.8 % (ref 36–66)
PHOSPHATE SERPL-MCNC: 1.9 MG/DL (ref 2.4–5.1)
PLATELET # BLD AUTO: 167 10^3/UL (ref 150–450)
POTASSIUM SERPL-SCNC: 3.8 MMOL/L (ref 3.5–5.1)
POTASSIUM SERPL-SCNC: 4.3 MMOL/L (ref 3.5–5.1)
RBC # BLD AUTO: 3.82 10^6/UL (ref 4.3–6.1)
SODIUM SERPL-SCNC: 143 MMOL/L (ref 136–145)
SODIUM SERPL-SCNC: 148 MMOL/L (ref 136–145)
WBC # BLD AUTO: 9.8 10^3/UL (ref 4–10)

## 2024-02-22 RX ADMIN — SODIUM CHLORIDE SCH MLS/HR: 4.5 INJECTION, SOLUTION INTRAVENOUS at 10:26

## 2024-02-22 RX ADMIN — ATORVASTATIN CALCIUM SCH MG: 20 TABLET, FILM COATED ORAL at 08:20

## 2024-02-22 RX ADMIN — ASPIRIN SCH MG: 81 TABLET ORAL at 08:20

## 2024-02-22 RX ADMIN — DEXTROSE MONOHYDRATE ONE MLS/HR: 50 INJECTION, SOLUTION INTRAVENOUS at 22:06

## 2024-02-22 RX ADMIN — MAGNESIUM SULFATE IN DEXTROSE ONE MLS/HR: 10 INJECTION, SOLUTION INTRAVENOUS at 20:32

## 2024-02-23 VITALS — TEMPERATURE: 98.2 F | SYSTOLIC BLOOD PRESSURE: 130 MMHG | DIASTOLIC BLOOD PRESSURE: 60 MMHG | OXYGEN SATURATION: 95 %

## 2024-02-23 VITALS — DIASTOLIC BLOOD PRESSURE: 56 MMHG | TEMPERATURE: 98.1 F | OXYGEN SATURATION: 98 % | SYSTOLIC BLOOD PRESSURE: 113 MMHG

## 2024-02-23 VITALS — OXYGEN SATURATION: 97 % | DIASTOLIC BLOOD PRESSURE: 68 MMHG | TEMPERATURE: 97.3 F | SYSTOLIC BLOOD PRESSURE: 129 MMHG

## 2024-02-23 VITALS — DIASTOLIC BLOOD PRESSURE: 72 MMHG | SYSTOLIC BLOOD PRESSURE: 123 MMHG | TEMPERATURE: 98.8 F | OXYGEN SATURATION: 98 %

## 2024-02-23 VITALS — TEMPERATURE: 97.6 F | OXYGEN SATURATION: 97 % | SYSTOLIC BLOOD PRESSURE: 91 MMHG | DIASTOLIC BLOOD PRESSURE: 52 MMHG

## 2024-02-23 VITALS — TEMPERATURE: 100.2 F | OXYGEN SATURATION: 97 % | SYSTOLIC BLOOD PRESSURE: 121 MMHG | DIASTOLIC BLOOD PRESSURE: 53 MMHG

## 2024-02-23 VITALS — DIASTOLIC BLOOD PRESSURE: 56 MMHG | SYSTOLIC BLOOD PRESSURE: 107 MMHG

## 2024-02-23 VITALS — SYSTOLIC BLOOD PRESSURE: 134 MMHG | DIASTOLIC BLOOD PRESSURE: 63 MMHG | OXYGEN SATURATION: 98 % | TEMPERATURE: 98.3 F

## 2024-02-23 VITALS — DIASTOLIC BLOOD PRESSURE: 44 MMHG | SYSTOLIC BLOOD PRESSURE: 108 MMHG

## 2024-02-23 LAB
BASOPHILS # BLD AUTO: 0 10^3/UL (ref 0–0.2)
BASOPHILS NFR BLD AUTO: 0.1 % (ref 0–1)
BUN SERPL-MCNC: 27 MG/DL (ref 9–23)
CALCIUM SERPL-MCNC: 7.6 MG/DL (ref 8.3–10.6)
CHLORIDE SERPL-SCNC: 115 MMOL/L (ref 98–107)
CO2 SERPL-SCNC: 21 MMOL/L (ref 20–31)
CREAT SERPL-MCNC: 1.14 MG/DL (ref 0.7–1.3)
EOSINOPHIL # BLD AUTO: 0 10^3/UL (ref 0–0.5)
EOSINOPHIL NFR BLD AUTO: 0.5 % (ref 0–3)
GFR SERPL CREATININE-BSD FRML MDRD: > 60 ML/MIN/{1.73_M2} (ref 35–?)
GLUCOSE SERPL-MCNC: 219 MG/DL (ref 74–106)
HCT VFR BLD AUTO: 33.9 % (ref 42–52)
HGB BLD-MCNC: 11 G/DL (ref 13.5–17.5)
LYMPHOCYTES # BLD AUTO: 2.1 10^3/UL (ref 1.5–5)
LYMPHOCYTES NFR BLD AUTO: 27.3 % (ref 24–44)
MAGNESIUM SERPL-MCNC: 2 MG/DL (ref 1.8–2.4)
MCH RBC QN AUTO: 29.9 PG (ref 27–33)
MCHC RBC AUTO-ENTMCNC: 32.4 G/DL (ref 32–36.5)
MCV RBC AUTO: 92.1 FL (ref 80–96)
MONOCYTES # BLD AUTO: 0.6 10^3/UL (ref 0–0.8)
MONOCYTES NFR BLD AUTO: 7.3 % (ref 2–8)
NEUTROPHILS # BLD AUTO: 5 10^3/UL (ref 1.5–8.5)
NEUTROPHILS NFR BLD AUTO: 64 % (ref 36–66)
PHOSPHATE SERPL-MCNC: 3.2 MG/DL (ref 2.4–5.1)
PLATELET # BLD AUTO: 138 10^3/UL (ref 150–450)
POTASSIUM SERPL-SCNC: 4.3 MMOL/L (ref 3.5–5.1)
RBC # BLD AUTO: 3.68 10^6/UL (ref 4.3–6.1)
SODIUM SERPL-SCNC: 142 MMOL/L (ref 136–145)
WBC # BLD AUTO: 7.9 10^3/UL (ref 4–10)

## 2024-02-24 VITALS — OXYGEN SATURATION: 95 % | TEMPERATURE: 97.5 F | SYSTOLIC BLOOD PRESSURE: 119 MMHG | DIASTOLIC BLOOD PRESSURE: 57 MMHG

## 2024-02-24 VITALS — OXYGEN SATURATION: 96 % | TEMPERATURE: 97.6 F | DIASTOLIC BLOOD PRESSURE: 95 MMHG | SYSTOLIC BLOOD PRESSURE: 106 MMHG

## 2024-02-24 VITALS — TEMPERATURE: 98 F | OXYGEN SATURATION: 97 % | SYSTOLIC BLOOD PRESSURE: 108 MMHG | DIASTOLIC BLOOD PRESSURE: 51 MMHG

## 2024-02-24 VITALS — DIASTOLIC BLOOD PRESSURE: 55 MMHG | OXYGEN SATURATION: 97 % | TEMPERATURE: 98.2 F | SYSTOLIC BLOOD PRESSURE: 98 MMHG

## 2024-02-24 VITALS — SYSTOLIC BLOOD PRESSURE: 140 MMHG | DIASTOLIC BLOOD PRESSURE: 68 MMHG | TEMPERATURE: 97.5 F | OXYGEN SATURATION: 92 %

## 2024-02-24 VITALS — SYSTOLIC BLOOD PRESSURE: 125 MMHG | OXYGEN SATURATION: 96 % | DIASTOLIC BLOOD PRESSURE: 59 MMHG | TEMPERATURE: 97.7 F

## 2024-02-24 LAB
BASOPHILS # BLD AUTO: 0 10^3/UL (ref 0–0.2)
BASOPHILS NFR BLD AUTO: 0.3 % (ref 0–1)
BUN SERPL-MCNC: 18 MG/DL (ref 9–23)
CALCIUM SERPL-MCNC: 7.4 MG/DL (ref 8.3–10.6)
CHLORIDE SERPL-SCNC: 117 MMOL/L (ref 98–107)
CHLORIDE UR-SCNC: 69 MMOL/L
CO2 SERPL-SCNC: 19 MMOL/L (ref 20–31)
CREAT SERPL-MCNC: 1.12 MG/DL (ref 0.7–1.3)
EOSINOPHIL # BLD AUTO: 0 10^3/UL (ref 0–0.5)
EOSINOPHIL NFR BLD AUTO: 0.3 % (ref 0–3)
GFR SERPL CREATININE-BSD FRML MDRD: > 60 ML/MIN/{1.73_M2} (ref 35–?)
GLUCOSE SERPL-MCNC: 307 MG/DL (ref 74–106)
HCT VFR BLD AUTO: 35.6 % (ref 42–52)
HGB BLD-MCNC: 11.6 G/DL (ref 13.5–17.5)
LYMPHOCYTES # BLD AUTO: 1.3 10^3/UL (ref 1.5–5)
LYMPHOCYTES NFR BLD AUTO: 18.2 % (ref 24–44)
MAGNESIUM SERPL-MCNC: 1.8 MG/DL (ref 1.8–2.4)
MCH RBC QN AUTO: 30 PG (ref 27–33)
MCHC RBC AUTO-ENTMCNC: 32.6 G/DL (ref 32–36.5)
MCV RBC AUTO: 92 FL (ref 80–96)
MONOCYTES # BLD AUTO: 0.5 10^3/UL (ref 0–0.8)
MONOCYTES NFR BLD AUTO: 7.4 % (ref 2–8)
NEUTROPHILS # BLD AUTO: 5.2 10^3/UL (ref 1.5–8.5)
NEUTROPHILS NFR BLD AUTO: 73.1 % (ref 36–66)
PLATELET # BLD AUTO: 145 10^3/UL (ref 150–450)
POTASSIUM 24H UR-SCNC: 19 MMOL/L
POTASSIUM SERPL-SCNC: 4.2 MMOL/L (ref 3.5–5.1)
RBC # BLD AUTO: 3.87 10^6/UL (ref 4.3–6.1)
SODIUM SERPL-SCNC: 144 MMOL/L (ref 136–145)
SODIUM UR-SCNC: 63 MMOL/L
WBC # BLD AUTO: 7.2 10^3/UL (ref 4–10)

## 2024-02-24 RX ADMIN — METOPROLOL TARTRATE SCH MG: 25 TABLET, FILM COATED ORAL at 22:30

## 2024-02-24 RX ADMIN — AMOXICILLIN SCH MG: 500 CAPSULE ORAL at 12:19

## 2024-02-25 VITALS — TEMPERATURE: 97.9 F | SYSTOLIC BLOOD PRESSURE: 116 MMHG | DIASTOLIC BLOOD PRESSURE: 53 MMHG | OXYGEN SATURATION: 98 %

## 2024-02-25 VITALS — TEMPERATURE: 98.1 F | SYSTOLIC BLOOD PRESSURE: 123 MMHG | DIASTOLIC BLOOD PRESSURE: 57 MMHG | OXYGEN SATURATION: 93 %

## 2024-02-25 VITALS — OXYGEN SATURATION: 92 % | SYSTOLIC BLOOD PRESSURE: 119 MMHG | DIASTOLIC BLOOD PRESSURE: 58 MMHG | TEMPERATURE: 97.5 F

## 2024-02-25 LAB
BASOPHILS # BLD AUTO: 0 10^3/UL (ref 0–0.2)
BASOPHILS NFR BLD AUTO: 0.2 % (ref 0–1)
BUN SERPL-MCNC: 13 MG/DL (ref 9–23)
CALCIUM SERPL-MCNC: 7.6 MG/DL (ref 8.3–10.6)
CHLORIDE SERPL-SCNC: 115 MMOL/L (ref 98–107)
CO2 SERPL-SCNC: 20 MMOL/L (ref 20–31)
CREAT SERPL-MCNC: 1.02 MG/DL (ref 0.7–1.3)
EOSINOPHIL # BLD AUTO: 0.1 10^3/UL (ref 0–0.5)
EOSINOPHIL NFR BLD AUTO: 1 % (ref 0–3)
GFR SERPL CREATININE-BSD FRML MDRD: > 60 ML/MIN/{1.73_M2} (ref 35–?)
GLUCOSE SERPL-MCNC: 238 MG/DL (ref 74–106)
HCT VFR BLD AUTO: 34.8 % (ref 42–52)
HGB BLD-MCNC: 11.3 G/DL (ref 13.5–17.5)
LYMPHOCYTES # BLD AUTO: 1.7 10^3/UL (ref 1.5–5)
LYMPHOCYTES NFR BLD AUTO: 21.2 % (ref 24–44)
MAGNESIUM SERPL-MCNC: 1.7 MG/DL (ref 1.8–2.4)
MCH RBC QN AUTO: 29.8 PG (ref 27–33)
MCHC RBC AUTO-ENTMCNC: 32.5 G/DL (ref 32–36.5)
MCV RBC AUTO: 91.8 FL (ref 80–96)
MONOCYTES # BLD AUTO: 0.8 10^3/UL (ref 0–0.8)
MONOCYTES NFR BLD AUTO: 10.3 % (ref 2–8)
NEUTROPHILS # BLD AUTO: 5.4 10^3/UL (ref 1.5–8.5)
NEUTROPHILS NFR BLD AUTO: 66.7 % (ref 36–66)
PLATELET # BLD AUTO: 151 10^3/UL (ref 150–450)
POTASSIUM SERPL-SCNC: 3.4 MMOL/L (ref 3.5–5.1)
RBC # BLD AUTO: 3.79 10^6/UL (ref 4.3–6.1)
SODIUM SERPL-SCNC: 143 MMOL/L (ref 136–145)
WBC # BLD AUTO: 8 10^3/UL (ref 4–10)

## 2024-02-25 RX ADMIN — POTASSIUM CHLORIDE ONE MEQ: 750 TABLET, EXTENDED RELEASE ORAL at 08:05

## 2024-02-25 RX ADMIN — INSULIN DETEMIR SCH UNITS: 100 INJECTION, SOLUTION SUBCUTANEOUS at 08:08

## 2024-02-25 RX ADMIN — MAGNESIUM SULFATE IN DEXTROSE SCH MLS/HR: 10 INJECTION, SOLUTION INTRAVENOUS at 08:10

## 2024-02-26 VITALS — SYSTOLIC BLOOD PRESSURE: 104 MMHG | DIASTOLIC BLOOD PRESSURE: 48 MMHG

## 2024-02-26 VITALS — SYSTOLIC BLOOD PRESSURE: 114 MMHG | DIASTOLIC BLOOD PRESSURE: 50 MMHG | TEMPERATURE: 98.6 F | OXYGEN SATURATION: 96 %

## 2024-02-26 VITALS — OXYGEN SATURATION: 91 % | DIASTOLIC BLOOD PRESSURE: 44 MMHG | SYSTOLIC BLOOD PRESSURE: 92 MMHG | TEMPERATURE: 98.1 F

## 2024-02-26 VITALS — SYSTOLIC BLOOD PRESSURE: 121 MMHG | DIASTOLIC BLOOD PRESSURE: 58 MMHG | OXYGEN SATURATION: 94 % | TEMPERATURE: 98.2 F

## 2024-02-26 VITALS — DIASTOLIC BLOOD PRESSURE: 44 MMHG | SYSTOLIC BLOOD PRESSURE: 92 MMHG

## 2024-02-26 VITALS — SYSTOLIC BLOOD PRESSURE: 122 MMHG | DIASTOLIC BLOOD PRESSURE: 44 MMHG

## 2024-02-26 LAB
BASOPHILS # BLD AUTO: 0 10^3/UL (ref 0–0.2)
BASOPHILS NFR BLD AUTO: 0.4 % (ref 0–1)
BUN SERPL-MCNC: 11 MG/DL (ref 9–23)
CALCIUM SERPL-MCNC: 7.3 MG/DL (ref 8.3–10.6)
CHLORIDE SERPL-SCNC: 114 MMOL/L (ref 98–107)
CO2 SERPL-SCNC: 21 MMOL/L (ref 20–31)
CREAT SERPL-MCNC: 0.95 MG/DL (ref 0.7–1.3)
EOSINOPHIL # BLD AUTO: 0.1 10^3/UL (ref 0–0.5)
EOSINOPHIL NFR BLD AUTO: 1.3 % (ref 0–3)
GFR SERPL CREATININE-BSD FRML MDRD: > 60 ML/MIN/{1.73_M2} (ref 35–?)
GLUCOSE SERPL-MCNC: 240 MG/DL (ref 74–106)
HCT VFR BLD AUTO: 36.5 % (ref 42–52)
HGB BLD-MCNC: 11.3 G/DL (ref 13.5–17.5)
LYMPHOCYTES # BLD AUTO: 1.9 10^3/UL (ref 1.5–5)
LYMPHOCYTES NFR BLD AUTO: 26.9 % (ref 24–44)
MAGNESIUM SERPL-MCNC: 1.8 MG/DL (ref 1.8–2.4)
MCH RBC QN AUTO: 30.1 PG (ref 27–33)
MCHC RBC AUTO-ENTMCNC: 31 G/DL (ref 32–36.5)
MCV RBC AUTO: 97.1 FL (ref 80–96)
MONOCYTES # BLD AUTO: 0.8 10^3/UL (ref 0–0.8)
MONOCYTES NFR BLD AUTO: 10.9 % (ref 2–8)
NEUTROPHILS # BLD AUTO: 4.1 10^3/UL (ref 1.5–8.5)
NEUTROPHILS NFR BLD AUTO: 59.3 % (ref 36–66)
PLATELET # BLD AUTO: 140 10^3/UL (ref 150–450)
POTASSIUM SERPL-SCNC: 4.1 MMOL/L (ref 3.5–5.1)
RBC # BLD AUTO: 3.76 10^6/UL (ref 4.3–6.1)
SODIUM SERPL-SCNC: 141 MMOL/L (ref 136–145)
WBC # BLD AUTO: 7 10^3/UL (ref 4–10)

## 2024-02-26 RX ADMIN — SODIUM CHLORIDE ONE MLS/HR: 9 INJECTION, SOLUTION INTRAVENOUS at 15:27

## 2024-02-26 RX ADMIN — BENZONATATE PRN MG: 100 CAPSULE ORAL at 15:31

## 2024-02-26 RX ADMIN — INSULIN DETEMIR SCH UNITS: 100 INJECTION, SOLUTION SUBCUTANEOUS at 08:50

## 2024-02-27 VITALS — SYSTOLIC BLOOD PRESSURE: 113 MMHG | TEMPERATURE: 98.4 F | OXYGEN SATURATION: 94 % | DIASTOLIC BLOOD PRESSURE: 56 MMHG

## 2024-02-27 VITALS — OXYGEN SATURATION: 93 % | DIASTOLIC BLOOD PRESSURE: 51 MMHG | TEMPERATURE: 98.6 F | SYSTOLIC BLOOD PRESSURE: 100 MMHG

## 2024-02-27 VITALS — TEMPERATURE: 98.4 F | SYSTOLIC BLOOD PRESSURE: 100 MMHG | OXYGEN SATURATION: 93 % | DIASTOLIC BLOOD PRESSURE: 50 MMHG

## 2024-02-27 VITALS — OXYGEN SATURATION: 92 % | SYSTOLIC BLOOD PRESSURE: 117 MMHG | DIASTOLIC BLOOD PRESSURE: 56 MMHG | TEMPERATURE: 97.7 F

## 2024-02-27 LAB
BASOPHILS # BLD AUTO: 0 10^3/UL (ref 0–0.2)
BASOPHILS NFR BLD AUTO: 0.3 % (ref 0–1)
BUN SERPL-MCNC: 10 MG/DL (ref 9–23)
CALCIUM SERPL-MCNC: 7.6 MG/DL (ref 8.3–10.6)
CHLORIDE SERPL-SCNC: 112 MMOL/L (ref 98–107)
CO2 SERPL-SCNC: 24 MMOL/L (ref 20–31)
CREAT SERPL-MCNC: 0.93 MG/DL (ref 0.7–1.3)
EOSINOPHIL # BLD AUTO: 0.1 10^3/UL (ref 0–0.5)
EOSINOPHIL NFR BLD AUTO: 1.8 % (ref 0–3)
GFR SERPL CREATININE-BSD FRML MDRD: > 60 ML/MIN/{1.73_M2} (ref 35–?)
GLUCOSE SERPL-MCNC: 196 MG/DL (ref 74–106)
HCT VFR BLD AUTO: 35 % (ref 42–52)
HGB BLD-MCNC: 11.5 G/DL (ref 13.5–17.5)
LYMPHOCYTES # BLD AUTO: 2.4 10^3/UL (ref 1.5–5)
LYMPHOCYTES NFR BLD AUTO: 33.5 % (ref 24–44)
MAGNESIUM SERPL-MCNC: 1.7 MG/DL (ref 1.8–2.4)
MCH RBC QN AUTO: 30.3 PG (ref 27–33)
MCHC RBC AUTO-ENTMCNC: 32.9 G/DL (ref 32–36.5)
MCV RBC AUTO: 92.1 FL (ref 80–96)
MONOCYTES # BLD AUTO: 0.8 10^3/UL (ref 0–0.8)
MONOCYTES NFR BLD AUTO: 11.5 % (ref 2–8)
NEUTROPHILS # BLD AUTO: 3.7 10^3/UL (ref 1.5–8.5)
NEUTROPHILS NFR BLD AUTO: 51.6 % (ref 36–66)
PLATELET # BLD AUTO: 215 10^3/UL (ref 150–450)
POTASSIUM SERPL-SCNC: 4.1 MMOL/L (ref 3.5–5.1)
RBC # BLD AUTO: 3.8 10^6/UL (ref 4.3–6.1)
SODIUM SERPL-SCNC: 141 MMOL/L (ref 136–145)
WBC # BLD AUTO: 7.2 10^3/UL (ref 4–10)

## 2024-02-27 RX ADMIN — MAGNESIUM OXIDE SCH MG: 400 TABLET ORAL at 12:26

## 2024-02-28 VITALS — DIASTOLIC BLOOD PRESSURE: 42 MMHG | TEMPERATURE: 97.9 F | OXYGEN SATURATION: 93 % | SYSTOLIC BLOOD PRESSURE: 102 MMHG

## 2024-02-28 VITALS — SYSTOLIC BLOOD PRESSURE: 118 MMHG | OXYGEN SATURATION: 93 % | DIASTOLIC BLOOD PRESSURE: 62 MMHG | TEMPERATURE: 97.9 F

## 2024-02-28 VITALS — SYSTOLIC BLOOD PRESSURE: 119 MMHG | OXYGEN SATURATION: 95 % | DIASTOLIC BLOOD PRESSURE: 48 MMHG | TEMPERATURE: 97.9 F

## 2024-02-28 LAB
BASOPHILS # BLD AUTO: 0 10^3/UL (ref 0–0.2)
BASOPHILS NFR BLD AUTO: 0.5 % (ref 0–1)
BUN SERPL-MCNC: 12 MG/DL (ref 9–23)
CALCIUM SERPL-MCNC: 7.6 MG/DL (ref 8.3–10.6)
CHLORIDE SERPL-SCNC: 109 MMOL/L (ref 98–107)
CO2 SERPL-SCNC: 26 MMOL/L (ref 20–31)
CREAT SERPL-MCNC: 0.95 MG/DL (ref 0.7–1.3)
EOSINOPHIL # BLD AUTO: 0.1 10^3/UL (ref 0–0.5)
EOSINOPHIL NFR BLD AUTO: 1.2 % (ref 0–3)
GFR SERPL CREATININE-BSD FRML MDRD: > 60 ML/MIN/{1.73_M2} (ref 35–?)
GLUCOSE SERPL-MCNC: 347 MG/DL (ref 74–106)
HCT VFR BLD AUTO: 35.3 % (ref 42–52)
HGB BLD-MCNC: 11.5 G/DL (ref 13.5–17.5)
LYMPHOCYTES # BLD AUTO: 1.9 10^3/UL (ref 1.5–5)
LYMPHOCYTES NFR BLD AUTO: 33.2 % (ref 24–44)
MAGNESIUM SERPL-MCNC: 1.8 MG/DL (ref 1.8–2.4)
MCH RBC QN AUTO: 30 PG (ref 27–33)
MCHC RBC AUTO-ENTMCNC: 32.6 G/DL (ref 32–36.5)
MCV RBC AUTO: 92.2 FL (ref 80–96)
MONOCYTES # BLD AUTO: 0.6 10^3/UL (ref 0–0.8)
MONOCYTES NFR BLD AUTO: 10.3 % (ref 2–8)
NEUTROPHILS # BLD AUTO: 3 10^3/UL (ref 1.5–8.5)
NEUTROPHILS NFR BLD AUTO: 52.4 % (ref 36–66)
PLATELET # BLD AUTO: 239 10^3/UL (ref 150–450)
POTASSIUM SERPL-SCNC: 4.5 MMOL/L (ref 3.5–5.1)
RBC # BLD AUTO: 3.83 10^6/UL (ref 4.3–6.1)
SODIUM SERPL-SCNC: 139 MMOL/L (ref 136–145)
WBC # BLD AUTO: 5.7 10^3/UL (ref 4–10)

## 2024-02-28 RX ADMIN — INSULIN DETEMIR SCH UNITS: 100 INJECTION, SOLUTION SUBCUTANEOUS at 20:34

## 2024-02-29 VITALS — DIASTOLIC BLOOD PRESSURE: 42 MMHG | TEMPERATURE: 97.9 F | SYSTOLIC BLOOD PRESSURE: 104 MMHG | OXYGEN SATURATION: 94 %

## 2024-02-29 VITALS — OXYGEN SATURATION: 93 % | DIASTOLIC BLOOD PRESSURE: 60 MMHG | SYSTOLIC BLOOD PRESSURE: 130 MMHG | TEMPERATURE: 97.7 F

## 2024-02-29 VITALS — TEMPERATURE: 97.9 F | SYSTOLIC BLOOD PRESSURE: 108 MMHG | DIASTOLIC BLOOD PRESSURE: 40 MMHG | OXYGEN SATURATION: 91 %

## 2024-02-29 RX ADMIN — INSULIN DETEMIR SCH UNITS: 100 INJECTION, SOLUTION SUBCUTANEOUS at 21:00

## 2024-03-01 VITALS — DIASTOLIC BLOOD PRESSURE: 50 MMHG | TEMPERATURE: 97.9 F | SYSTOLIC BLOOD PRESSURE: 112 MMHG | OXYGEN SATURATION: 94 %

## 2024-03-14 ENCOUNTER — HOSPITAL ENCOUNTER (OUTPATIENT)
Dept: HOSPITAL 53 - M PLALAB | Age: 89
End: 2024-03-14
Attending: FAMILY MEDICINE
Payer: MEDICARE

## 2024-03-14 DIAGNOSIS — N18.31: Primary | ICD-10-CM

## 2024-03-14 DIAGNOSIS — Z79.899: ICD-10-CM

## 2024-03-14 DIAGNOSIS — D63.8: ICD-10-CM

## 2024-03-14 LAB
25(OH)D3 SERPL-MCNC: 49.9 NG/ML (ref 20–100)
ALBUMIN SERPL BCG-MCNC: 2.9 G/DL (ref 3.2–5.2)
ALP SERPL-CCNC: 118 U/L (ref 46–116)
ALT SERPL W P-5'-P-CCNC: 20 U/L (ref 7–40)
AST SERPL-CCNC: 12 U/L (ref ?–34)
BASOPHILS # BLD AUTO: 0 10^3/UL (ref 0–0.2)
BASOPHILS NFR BLD AUTO: 0.6 % (ref 0–1)
BILIRUB SERPL-MCNC: 0.5 MG/DL (ref 0.3–1.2)
BUN SERPL-MCNC: 13 MG/DL (ref 9–23)
CALCIUM SERPL-MCNC: 8.6 MG/DL (ref 8.3–10.6)
CHLORIDE SERPL-SCNC: 108 MMOL/L (ref 98–107)
CHOLEST SERPL-MCNC: 129 MG/DL (ref ?–200)
CHOLEST/HDLC SERPL: 4.13 {RATIO} (ref ?–5)
CO2 SERPL-SCNC: 27 MMOL/L (ref 20–31)
CREAT SERPL-MCNC: 1.02 MG/DL (ref 0.7–1.3)
EOSINOPHIL # BLD AUTO: 0.1 10^3/UL (ref 0–0.5)
EOSINOPHIL NFR BLD AUTO: 1.4 % (ref 0–3)
FERRITIN SERPL-MCNC: 239.7 NG/ML (ref 10.5–307.3)
GFR SERPL CREATININE-BSD FRML MDRD: > 60 ML/MIN/{1.73_M2} (ref 35–?)
GLUCOSE SERPL-MCNC: 161 MG/DL (ref 74–106)
HCT VFR BLD AUTO: 39.6 % (ref 42–52)
HDLC SERPL-MCNC: 31.2 MG/DL (ref 40–?)
HGB BLD-MCNC: 12.5 G/DL (ref 13.5–17.5)
LDLC SERPL CALC-MCNC: 73.4 MG/DL (ref ?–100)
LYMPHOCYTES # BLD AUTO: 2.8 10^3/UL (ref 1.5–5)
LYMPHOCYTES NFR BLD AUTO: 38.2 % (ref 24–44)
MCH RBC QN AUTO: 30 PG (ref 27–33)
MCHC RBC AUTO-ENTMCNC: 31.6 G/DL (ref 32–36.5)
MCV RBC AUTO: 95 FL (ref 80–96)
MONOCYTES # BLD AUTO: 0.6 10^3/UL (ref 0–0.8)
MONOCYTES NFR BLD AUTO: 8.3 % (ref 2–8)
NEUTROPHILS # BLD AUTO: 3.7 10^3/UL (ref 1.5–8.5)
NEUTROPHILS NFR BLD AUTO: 50.8 % (ref 36–66)
NONHDLC SERPL-MCNC: 97.8 MG/DL
PLATELET # BLD AUTO: 320 10^3/UL (ref 150–450)
POTASSIUM SERPL-SCNC: 4.1 MMOL/L (ref 3.5–5.1)
PROT SERPL-MCNC: 5.8 G/DL (ref 5.7–8.2)
PTH-INTACT SERPL-MCNC: 60.6 PG/ML (ref 18.5–88)
RBC # BLD AUTO: 4.17 10^6/UL (ref 4.3–6.1)
SODIUM SERPL-SCNC: 141 MMOL/L (ref 136–145)
T4 FREE SERPL-MCNC: 1 NG/DL (ref 0.89–1.76)
TRIGL SERPL-MCNC: 122 MG/DL (ref ?–150)
TSH SERPL DL<=0.005 MIU/L-ACNC: 5.05 UIU/ML (ref 0.55–4.78)
WBC # BLD AUTO: 7.3 10^3/UL (ref 4–10)

## 2024-03-15 ENCOUNTER — HOSPITAL ENCOUNTER (OUTPATIENT)
Dept: HOSPITAL 53 - M SFHCPLAZ | Age: 89
End: 2024-03-15
Attending: FAMILY MEDICINE
Payer: MEDICARE

## 2024-03-15 DIAGNOSIS — E11.65: ICD-10-CM

## 2024-03-15 DIAGNOSIS — E78.5: ICD-10-CM

## 2024-03-15 DIAGNOSIS — D63.8: ICD-10-CM

## 2024-03-15 DIAGNOSIS — N18.31: Primary | ICD-10-CM

## 2024-03-17 LAB
C PEPTIDE SERPL-MCNC: 1.1 NG/ML (ref 1.1–4.4)
STFR SERPL-MCNC: 19.3 NMOL/L (ref 12.2–27.3)

## 2024-03-19 ENCOUNTER — HOSPITAL ENCOUNTER (OUTPATIENT)
Dept: HOSPITAL 53 - M SFHCPLAZ | Age: 89
End: 2024-03-19
Attending: FAMILY MEDICINE
Payer: MEDICARE

## 2024-03-19 DIAGNOSIS — N18.31: Primary | ICD-10-CM

## 2024-03-19 DIAGNOSIS — E11.65: ICD-10-CM

## 2024-03-19 DIAGNOSIS — D63.8: ICD-10-CM

## 2024-03-19 DIAGNOSIS — E78.5: ICD-10-CM

## 2024-04-03 ENCOUNTER — HOSPITAL ENCOUNTER (EMERGENCY)
Dept: HOSPITAL 53 - M ED | Age: 89
Discharge: HOME | End: 2024-04-03
Payer: MEDICARE

## 2024-04-03 VITALS — DIASTOLIC BLOOD PRESSURE: 54 MMHG | TEMPERATURE: 98.4 F | SYSTOLIC BLOOD PRESSURE: 112 MMHG

## 2024-04-03 VITALS — BODY MASS INDEX: 26.4 KG/M2 | HEIGHT: 72 IN | WEIGHT: 194.89 LBS

## 2024-04-03 VITALS — OXYGEN SATURATION: 96 %
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
## 2024-04-03 DIAGNOSIS — Z79.899: ICD-10-CM
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
## 2024-04-03 DIAGNOSIS — S80.812A: ICD-10-CM
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
## 2024-04-03 DIAGNOSIS — Z79.82: ICD-10-CM
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
## 2024-04-03 DIAGNOSIS — S80.811A: Primary | ICD-10-CM

## 2024-04-03 DIAGNOSIS — Z79.4: ICD-10-CM

## 2024-04-03 DIAGNOSIS — E11.9: ICD-10-CM

## 2024-04-03 DIAGNOSIS — S61.213A: ICD-10-CM

## 2024-04-03 RX ADMIN — CEPHALEXIN ONE MG: 500 CAPSULE ORAL at 13:10

## 2024-04-03 RX ADMIN — LIDOCAINE HYDROCHLORIDE ONE ML: 20 INJECTION, SOLUTION INFILTRATION; PERINEURAL at 12:15

## 2024-04-05 ENCOUNTER — HOSPITAL ENCOUNTER (OUTPATIENT)
Dept: HOSPITAL 53 - M SOG | Age: 89
End: 2024-04-05
Attending: PHYSICIAN ASSISTANT
Payer: MEDICARE

## 2024-04-05 DIAGNOSIS — M79.645: Primary | ICD-10-CM

## 2024-05-06 ENCOUNTER — HOSPITAL ENCOUNTER (OUTPATIENT)
Dept: HOSPITAL 53 - M SMT | Age: 89
End: 2024-05-06
Attending: UROLOGY
Payer: MEDICARE

## 2024-05-06 DIAGNOSIS — Z85.51: Primary | ICD-10-CM

## 2025-03-05 ENCOUNTER — HOSPITAL ENCOUNTER (OUTPATIENT)
Dept: HOSPITAL 53 - M PLALAB | Age: OVER 89
End: 2025-03-05
Attending: FAMILY MEDICINE
Payer: MEDICARE

## 2025-03-05 DIAGNOSIS — E11.65: Primary | ICD-10-CM

## 2025-03-05 LAB
ALBUMIN SERPL BCG-MCNC: 3.7 G/DL (ref 3.2–5.2)
ALP SERPL-CCNC: 129 U/L (ref 40–129)
ALT SERPL W P-5'-P-CCNC: 16 U/L (ref 7–40)
AST SERPL-CCNC: 9 U/L (ref ?–34)
BILIRUB SERPL-MCNC: 0.5 MG/DL (ref 0.3–1.2)
BUN SERPL-MCNC: 18 MG/DL (ref 9–23)
CALCIUM SERPL-MCNC: 8.8 MG/DL (ref 8.3–10.6)
CHLORIDE SERPL-SCNC: 107 MMOL/L (ref 98–107)
CHOLEST SERPL-MCNC: 116 MG/DL (ref ?–200)
CHOLEST/HDLC SERPL: 3.82 {RATIO} (ref ?–5)
CO2 SERPL-SCNC: 26 MMOL/L (ref 20–31)
CREAT SERPL-MCNC: 1.16 MG/DL (ref 0.7–1.3)
EST. AVERAGE GLUCOSE BLD GHB EST-MCNC: 200 MG/DL (ref 60–110)
GFR SERPL CREATININE-BSD FRML MDRD: > 60 ML/MIN/{1.73_M2} (ref 35–?)
GLUCOSE SERPL-MCNC: 228 MG/DL (ref 74–106)
HDLC SERPL-MCNC: 30.3 MG/DL (ref 40–?)
LDLC SERPL CALC-MCNC: 50.9 MG/DL (ref ?–100)
NONHDLC SERPL-MCNC: 85.7 MG/DL
POTASSIUM SERPL-SCNC: 4.8 MMOL/L (ref 3.5–5.1)
PROT SERPL-MCNC: 6.9 G/DL (ref 5.7–8.2)
SODIUM SERPL-SCNC: 143 MMOL/L (ref 136–145)
TRIGL SERPL-MCNC: 174 MG/DL (ref ?–150)